# Patient Record
Sex: FEMALE | Race: WHITE | NOT HISPANIC OR LATINO | URBAN - METROPOLITAN AREA
[De-identification: names, ages, dates, MRNs, and addresses within clinical notes are randomized per-mention and may not be internally consistent; named-entity substitution may affect disease eponyms.]

---

## 2017-05-17 ENCOUNTER — INPATIENT (INPATIENT)
Facility: HOSPITAL | Age: 82
LOS: 8 days | Discharge: HOME | DRG: 190 | End: 2017-05-26
Attending: INTERNAL MEDICINE | Admitting: INTERNAL MEDICINE
Payer: MEDICARE

## 2017-05-17 VITALS
OXYGEN SATURATION: 98 % | RESPIRATION RATE: 18 BRPM | DIASTOLIC BLOOD PRESSURE: 81 MMHG | TEMPERATURE: 98 F | HEART RATE: 85 BPM | SYSTOLIC BLOOD PRESSURE: 138 MMHG

## 2017-05-17 DIAGNOSIS — J18.9 PNEUMONIA, UNSPECIFIED ORGANISM: ICD-10-CM

## 2017-05-17 LAB
ALBUMIN SERPL ELPH-MCNC: 3.3 G/DL — SIGNIFICANT CHANGE UP (ref 3.3–5)
ALP SERPL-CCNC: 127 U/L — HIGH (ref 40–120)
ALT FLD-CCNC: 27 U/L RC — SIGNIFICANT CHANGE UP (ref 10–45)
ANION GAP SERPL CALC-SCNC: 14 MMOL/L — SIGNIFICANT CHANGE UP (ref 5–17)
APTT BLD: 27.3 SEC — LOW (ref 27.5–37.4)
AST SERPL-CCNC: 29 U/L — SIGNIFICANT CHANGE UP (ref 10–40)
BILIRUB SERPL-MCNC: 0.4 MG/DL — SIGNIFICANT CHANGE UP (ref 0.2–1.2)
BUN SERPL-MCNC: 20 MG/DL — SIGNIFICANT CHANGE UP (ref 7–23)
CALCIUM SERPL-MCNC: 8.9 MG/DL — SIGNIFICANT CHANGE UP (ref 8.4–10.5)
CHLORIDE SERPL-SCNC: 97 MMOL/L — SIGNIFICANT CHANGE UP (ref 96–108)
CO2 SERPL-SCNC: 22 MMOL/L — SIGNIFICANT CHANGE UP (ref 22–31)
CREAT SERPL-MCNC: 0.82 MG/DL — SIGNIFICANT CHANGE UP (ref 0.5–1.3)
EOSINOPHIL NFR BLD AUTO: 1 % — SIGNIFICANT CHANGE UP (ref 0–6)
GAS PNL BLDV: SIGNIFICANT CHANGE UP
GLUCOSE SERPL-MCNC: 111 MG/DL — HIGH (ref 70–99)
HCT VFR BLD CALC: 37.4 % — SIGNIFICANT CHANGE UP (ref 34.5–45)
HGB BLD-MCNC: 12.5 G/DL — SIGNIFICANT CHANGE UP (ref 11.5–15.5)
INR BLD: 1.09 RATIO — SIGNIFICANT CHANGE UP (ref 0.88–1.16)
LYMPHOCYTES # BLD AUTO: 11 % — LOW (ref 13–44)
MCHC RBC-ENTMCNC: 30.8 PG — SIGNIFICANT CHANGE UP (ref 27–34)
MCHC RBC-ENTMCNC: 33.3 GM/DL — SIGNIFICANT CHANGE UP (ref 32–36)
MCV RBC AUTO: 92.3 FL — SIGNIFICANT CHANGE UP (ref 80–100)
MONOCYTES NFR BLD AUTO: 5 % — SIGNIFICANT CHANGE UP (ref 2–14)
NEUTROPHILS NFR BLD AUTO: 83 % — HIGH (ref 43–77)
NT-PROBNP SERPL-SCNC: 3031 PG/ML — HIGH (ref 0–300)
PLATELET # BLD AUTO: 279 K/UL — SIGNIFICANT CHANGE UP (ref 150–400)
POTASSIUM SERPL-MCNC: 4.4 MMOL/L — SIGNIFICANT CHANGE UP (ref 3.5–5.3)
POTASSIUM SERPL-SCNC: 4.4 MMOL/L — SIGNIFICANT CHANGE UP (ref 3.5–5.3)
PROT SERPL-MCNC: 6.4 G/DL — SIGNIFICANT CHANGE UP (ref 6–8.3)
PROTHROM AB SERPL-ACNC: 11.8 SEC — SIGNIFICANT CHANGE UP (ref 9.8–12.7)
RBC # BLD: 4.05 M/UL — SIGNIFICANT CHANGE UP (ref 3.8–5.2)
RBC # FLD: 12.7 % — SIGNIFICANT CHANGE UP (ref 10.3–14.5)
SODIUM SERPL-SCNC: 133 MMOL/L — LOW (ref 135–145)
WBC # BLD: 19.2 K/UL — HIGH (ref 3.8–10.5)
WBC # FLD AUTO: 19.2 K/UL — HIGH (ref 3.8–10.5)

## 2017-05-17 PROCEDURE — 71010: CPT | Mod: 26

## 2017-05-17 PROCEDURE — 99285 EMERGENCY DEPT VISIT HI MDM: CPT | Mod: 25,GC

## 2017-05-17 PROCEDURE — 93010 ELECTROCARDIOGRAM REPORT: CPT

## 2017-05-17 RX ORDER — CEFEPIME 1 G/1
2000 INJECTION, POWDER, FOR SOLUTION INTRAMUSCULAR; INTRAVENOUS ONCE
Qty: 0 | Refills: 0 | Status: COMPLETED | OUTPATIENT
Start: 2017-05-17 | End: 2017-05-17

## 2017-05-17 RX ORDER — VANCOMYCIN HCL 1 G
1000 VIAL (EA) INTRAVENOUS ONCE
Qty: 0 | Refills: 0 | Status: COMPLETED | OUTPATIENT
Start: 2017-05-17 | End: 2017-05-18

## 2017-05-17 RX ORDER — AZITHROMYCIN 500 MG/1
500 TABLET, FILM COATED ORAL ONCE
Qty: 0 | Refills: 0 | Status: COMPLETED | OUTPATIENT
Start: 2017-05-17 | End: 2017-05-17

## 2017-05-17 RX ORDER — IPRATROPIUM/ALBUTEROL SULFATE 18-103MCG
3 AEROSOL WITH ADAPTER (GRAM) INHALATION ONCE
Qty: 0 | Refills: 0 | Status: COMPLETED | OUTPATIENT
Start: 2017-05-17 | End: 2017-05-17

## 2017-05-17 RX ADMIN — Medication 3 MILLILITER(S): at 22:13

## 2017-05-17 RX ADMIN — CEFEPIME 100 MILLIGRAM(S): 1 INJECTION, POWDER, FOR SOLUTION INTRAMUSCULAR; INTRAVENOUS at 23:13

## 2017-05-17 RX ADMIN — AZITHROMYCIN 250 MILLIGRAM(S): 500 TABLET, FILM COATED ORAL at 23:47

## 2017-05-17 NOTE — ED ADULT NURSE NOTE - PSH
S/P Colonoscopy    S/P Endoscopy    S/P exploratory laparotomy  Lysis of Adhesion  S/P Small Bowel Resection    SBO (small bowel obstruction)  initial SBO approx 10 yrs ago s/p partial resection small intestine, then lysis of adhesions approx 2012

## 2017-05-17 NOTE — ED PROVIDER NOTE - ATTENDING CONTRIBUTION TO CARE
Attending MD Dick:  I personally have seen and examined this patient.  Resident note reviewed and agree on plan of care and except where noted.  See HPI, PE, and MDM for details.

## 2017-05-17 NOTE — ED ADULT NURSE NOTE - PMH
Benign Essential Hypertension    Biventricular heart failure    CAD (coronary artery disease)    CAD (coronary artery disease)    Carotid stenosis    chf    Chronic Glaucoma    GERD (gastroesophageal reflux disease)    Hiatal Hernia    HLD (hyperlipidemia)    HTN (hypertension)    Hyperlipidemia, Acquired    Mitral valve disorder    Osteopenia    RBBB    Small bowel obstruction  H/O

## 2017-05-17 NOTE — ED ADULT NURSE NOTE - OBJECTIVE STATEMENT
89 y/o female presenting to the ED with generalized weakness and cough x 1 week; Per patient has been bringing up yellow tinged flem; Patient states "was walking around in the heat all day"; Patient denies chest pain, SOB, difficulty breathing, n/v/d, dizziness, fevers, chills; Patient SPO2 92% on room air placed on 4 L NC O2 became 97%; bilateral strength and sensation in extremities; a&ox3; safety and comfort measures provided

## 2017-05-17 NOTE — ED PROVIDER NOTE - OBJECTIVE STATEMENT
89yo p/w shortness of breath for 1 day. Pt. reports yellow mucus production with cough. Denies fevers, abdominal pain, chest pain, nausea/vomiting/diarrhea. Pt. using nebulizer at home. Denies any missed medications(lasix). PCP: Ismael August Cards: Broderick León 88yo p/w shortness of breath for 1 day. Pt. reports yellow mucus production with cough. Son reports occasional fevers at home. Denies abdominal pain, chest pain, nausea/vomiting/diarrhea. Pt. using nebulizer at home. Denies any missed medications(lasix). PCP: Ismael August Cards: Broderick León

## 2017-05-17 NOTE — ED PROVIDER NOTE - MEDICAL DECISION MAKING DETAILS
Attending MD Dick: 89F with CHF, asthma presenting with cough and dyspnea, subjective fever as well at home, ddx includes PNA, CHF or asthma exacerbation, not much wheezing to suggest RAD so suspect PNA, will treat for HCAP, admit

## 2017-05-18 DIAGNOSIS — J18.9 PNEUMONIA, UNSPECIFIED ORGANISM: ICD-10-CM

## 2017-05-18 DIAGNOSIS — K21.9 GASTRO-ESOPHAGEAL REFLUX DISEASE WITHOUT ESOPHAGITIS: ICD-10-CM

## 2017-05-18 DIAGNOSIS — I50.40 UNSPECIFIED COMBINED SYSTOLIC (CONGESTIVE) AND DIASTOLIC (CONGESTIVE) HEART FAILURE: ICD-10-CM

## 2017-05-18 LAB
ANION GAP SERPL CALC-SCNC: 15 MMOL/L — SIGNIFICANT CHANGE UP (ref 5–17)
BUN SERPL-MCNC: 15 MG/DL — SIGNIFICANT CHANGE UP (ref 7–23)
CALCIUM SERPL-MCNC: 8.4 MG/DL — SIGNIFICANT CHANGE UP (ref 8.4–10.5)
CHLORIDE SERPL-SCNC: 101 MMOL/L — SIGNIFICANT CHANGE UP (ref 96–108)
CO2 SERPL-SCNC: 20 MMOL/L — LOW (ref 22–31)
CREAT SERPL-MCNC: 0.76 MG/DL — SIGNIFICANT CHANGE UP (ref 0.5–1.3)
GLUCOSE SERPL-MCNC: 74 MG/DL — SIGNIFICANT CHANGE UP (ref 70–99)
HCT VFR BLD CALC: 36.6 % — SIGNIFICANT CHANGE UP (ref 34.5–45)
HGB BLD-MCNC: 11.9 G/DL — SIGNIFICANT CHANGE UP (ref 11.5–15.5)
MCHC RBC-ENTMCNC: 28.7 PG — SIGNIFICANT CHANGE UP (ref 27–34)
MCHC RBC-ENTMCNC: 32.5 GM/DL — SIGNIFICANT CHANGE UP (ref 32–36)
MCV RBC AUTO: 88.4 FL — SIGNIFICANT CHANGE UP (ref 80–100)
PLATELET # BLD AUTO: 307 K/UL — SIGNIFICANT CHANGE UP (ref 150–400)
POTASSIUM SERPL-MCNC: 4.5 MMOL/L — SIGNIFICANT CHANGE UP (ref 3.5–5.3)
POTASSIUM SERPL-SCNC: 4.5 MMOL/L — SIGNIFICANT CHANGE UP (ref 3.5–5.3)
RAPID RVP RESULT: SIGNIFICANT CHANGE UP
RBC # BLD: 4.14 M/UL — SIGNIFICANT CHANGE UP (ref 3.8–5.2)
RBC # FLD: 14.5 % — SIGNIFICANT CHANGE UP (ref 10.3–14.5)
SODIUM SERPL-SCNC: 136 MMOL/L — SIGNIFICANT CHANGE UP (ref 135–145)
WBC # BLD: 13.07 K/UL — HIGH (ref 3.8–10.5)
WBC # FLD AUTO: 13.07 K/UL — HIGH (ref 3.8–10.5)

## 2017-05-18 PROCEDURE — 99222 1ST HOSP IP/OBS MODERATE 55: CPT

## 2017-05-18 PROCEDURE — 71250 CT THORAX DX C-: CPT | Mod: 26

## 2017-05-18 RX ORDER — SIMVASTATIN 20 MG/1
20 TABLET, FILM COATED ORAL AT BEDTIME
Qty: 0 | Refills: 0 | Status: DISCONTINUED | OUTPATIENT
Start: 2017-05-18 | End: 2017-05-26

## 2017-05-18 RX ORDER — RALOXIFENE HYDROCHLORIDE 60 MG/1
60 TABLET, COATED ORAL DAILY
Qty: 0 | Refills: 0 | Status: DISCONTINUED | OUTPATIENT
Start: 2017-05-18 | End: 2017-05-26

## 2017-05-18 RX ORDER — VANCOMYCIN HCL 1 G
1000 VIAL (EA) INTRAVENOUS ONCE
Qty: 0 | Refills: 0 | Status: COMPLETED | OUTPATIENT
Start: 2017-05-18 | End: 2017-05-18

## 2017-05-18 RX ORDER — LISINOPRIL 2.5 MG/1
10 TABLET ORAL DAILY
Qty: 0 | Refills: 0 | Status: DISCONTINUED | OUTPATIENT
Start: 2017-05-18 | End: 2017-05-26

## 2017-05-18 RX ORDER — CEFEPIME 1 G/1
2000 INJECTION, POWDER, FOR SOLUTION INTRAMUSCULAR; INTRAVENOUS ONCE
Qty: 0 | Refills: 0 | Status: COMPLETED | OUTPATIENT
Start: 2017-05-18 | End: 2017-05-18

## 2017-05-18 RX ORDER — FUROSEMIDE 40 MG
40 TABLET ORAL EVERY 24 HOURS
Qty: 0 | Refills: 0 | Status: COMPLETED | OUTPATIENT
Start: 2017-05-18 | End: 2017-05-18

## 2017-05-18 RX ORDER — AMLODIPINE BESYLATE 2.5 MG/1
2.5 TABLET ORAL DAILY
Qty: 0 | Refills: 0 | Status: DISCONTINUED | OUTPATIENT
Start: 2017-05-18 | End: 2017-05-26

## 2017-05-18 RX ORDER — ASPIRIN/CALCIUM CARB/MAGNESIUM 324 MG
81 TABLET ORAL DAILY
Qty: 0 | Refills: 0 | Status: DISCONTINUED | OUTPATIENT
Start: 2017-05-18 | End: 2017-05-26

## 2017-05-18 RX ORDER — METOPROLOL TARTRATE 50 MG
25 TABLET ORAL DAILY
Qty: 0 | Refills: 0 | Status: DISCONTINUED | OUTPATIENT
Start: 2017-05-18 | End: 2017-05-26

## 2017-05-18 RX ORDER — FUROSEMIDE 40 MG
20 TABLET ORAL DAILY
Qty: 0 | Refills: 0 | Status: DISCONTINUED | OUTPATIENT
Start: 2017-05-18 | End: 2017-05-26

## 2017-05-18 RX ORDER — CEFEPIME 1 G/1
INJECTION, POWDER, FOR SOLUTION INTRAMUSCULAR; INTRAVENOUS
Qty: 0 | Refills: 0 | Status: DISCONTINUED | OUTPATIENT
Start: 2017-05-18 | End: 2017-05-18

## 2017-05-18 RX ORDER — HEPARIN SODIUM 5000 [USP'U]/ML
5000 INJECTION INTRAVENOUS; SUBCUTANEOUS EVERY 12 HOURS
Qty: 0 | Refills: 0 | Status: DISCONTINUED | OUTPATIENT
Start: 2017-05-18 | End: 2017-05-26

## 2017-05-18 RX ORDER — PIPERACILLIN AND TAZOBACTAM 4; .5 G/20ML; G/20ML
3.38 INJECTION, POWDER, LYOPHILIZED, FOR SOLUTION INTRAVENOUS EVERY 8 HOURS
Qty: 0 | Refills: 0 | Status: DISCONTINUED | OUTPATIENT
Start: 2017-05-18 | End: 2017-05-18

## 2017-05-18 RX ORDER — VANCOMYCIN HCL 1 G
1000 VIAL (EA) INTRAVENOUS EVERY 24 HOURS
Qty: 0 | Refills: 0 | Status: DISCONTINUED | OUTPATIENT
Start: 2017-05-19 | End: 2017-05-21

## 2017-05-18 RX ORDER — PIPERACILLIN AND TAZOBACTAM 4; .5 G/20ML; G/20ML
3.38 INJECTION, POWDER, LYOPHILIZED, FOR SOLUTION INTRAVENOUS ONCE
Qty: 0 | Refills: 0 | Status: DISCONTINUED | OUTPATIENT
Start: 2017-05-18 | End: 2017-05-18

## 2017-05-18 RX ORDER — VANCOMYCIN HCL 1 G
VIAL (EA) INTRAVENOUS
Qty: 0 | Refills: 0 | Status: DISCONTINUED | OUTPATIENT
Start: 2017-05-18 | End: 2017-05-21

## 2017-05-18 RX ORDER — SODIUM CHLORIDE 9 MG/ML
1000 INJECTION INTRAMUSCULAR; INTRAVENOUS; SUBCUTANEOUS ONCE
Qty: 0 | Refills: 0 | Status: COMPLETED | OUTPATIENT
Start: 2017-05-18 | End: 2017-05-18

## 2017-05-18 RX ORDER — CEFEPIME 1 G/1
2000 INJECTION, POWDER, FOR SOLUTION INTRAMUSCULAR; INTRAVENOUS EVERY 12 HOURS
Qty: 0 | Refills: 0 | Status: DISCONTINUED | OUTPATIENT
Start: 2017-05-18 | End: 2017-05-18

## 2017-05-18 RX ORDER — PIPERACILLIN AND TAZOBACTAM 4; .5 G/20ML; G/20ML
3.38 INJECTION, POWDER, LYOPHILIZED, FOR SOLUTION INTRAVENOUS ONCE
Qty: 0 | Refills: 0 | Status: COMPLETED | OUTPATIENT
Start: 2017-05-18 | End: 2017-05-18

## 2017-05-18 RX ORDER — PIPERACILLIN AND TAZOBACTAM 4; .5 G/20ML; G/20ML
3.38 INJECTION, POWDER, LYOPHILIZED, FOR SOLUTION INTRAVENOUS EVERY 8 HOURS
Qty: 0 | Refills: 0 | Status: DISCONTINUED | OUTPATIENT
Start: 2017-05-18 | End: 2017-05-25

## 2017-05-18 RX ADMIN — Medication 81 MILLIGRAM(S): at 11:49

## 2017-05-18 RX ADMIN — AMLODIPINE BESYLATE 2.5 MILLIGRAM(S): 2.5 TABLET ORAL at 05:24

## 2017-05-18 RX ADMIN — Medication 100 MILLIGRAM(S): at 07:03

## 2017-05-18 RX ADMIN — SODIUM CHLORIDE 1000 MILLILITER(S): 9 INJECTION INTRAMUSCULAR; INTRAVENOUS; SUBCUTANEOUS at 03:06

## 2017-05-18 RX ADMIN — PIPERACILLIN AND TAZOBACTAM 200 GRAM(S): 4; .5 INJECTION, POWDER, LYOPHILIZED, FOR SOLUTION INTRAVENOUS at 18:18

## 2017-05-18 RX ADMIN — SIMVASTATIN 20 MILLIGRAM(S): 20 TABLET, FILM COATED ORAL at 21:00

## 2017-05-18 RX ADMIN — HEPARIN SODIUM 5000 UNIT(S): 5000 INJECTION INTRAVENOUS; SUBCUTANEOUS at 18:18

## 2017-05-18 RX ADMIN — LISINOPRIL 10 MILLIGRAM(S): 2.5 TABLET ORAL at 05:24

## 2017-05-18 RX ADMIN — Medication 250 MILLIGRAM(S): at 11:49

## 2017-05-18 RX ADMIN — CEFEPIME 100 MILLIGRAM(S): 1 INJECTION, POWDER, FOR SOLUTION INTRAMUSCULAR; INTRAVENOUS at 07:40

## 2017-05-18 RX ADMIN — Medication 100 MILLIGRAM(S): at 15:58

## 2017-05-18 RX ADMIN — Medication 250 MILLIGRAM(S): at 02:23

## 2017-05-18 RX ADMIN — Medication 100 MILLIGRAM(S): at 21:31

## 2017-05-18 RX ADMIN — Medication 25 MILLIGRAM(S): at 05:24

## 2017-05-18 RX ADMIN — RALOXIFENE HYDROCHLORIDE 60 MILLIGRAM(S): 60 TABLET, COATED ORAL at 15:58

## 2017-05-18 RX ADMIN — Medication 40 MILLIGRAM(S): at 05:24

## 2017-05-18 NOTE — H&P ADULT. - HISTORY OF PRESENT ILLNESS
· HPI Objective Statement: 89yo p/w shortness of breath for 1 day. Pt. reports yellow mucus production with cough. Son reports occasional fevers at home. Denies abdominal pain, chest pain, nausea/vomiting/diarrhea. Pt. using nebulizer at home. Denies any missed medications(lasix). PCP: Ismael August Cards: Broderick León

## 2017-05-18 NOTE — PATIENT PROFILE ADULT. - TEACHING/LEARNING LEARNING PREFERENCES
individual instruction individual instruction/written material/verbal instruction/skill demonstration

## 2017-05-18 NOTE — ED ADULT NURSE REASSESSMENT NOTE - NS ED NURSE REASSESS COMMENT FT1
0200 Patient appears to be resting comfortably in stretcher; denies pain at this time; no respiratory distress noted; a&ox3; safety and comfort measures provided

## 2017-05-18 NOTE — H&P ADULT. - PROBLEM SELECTOR PLAN 3
with start cefepime  will asp pulmonary and ID to see physical therapy  discussed with son at bedside

## 2017-05-19 LAB
ANION GAP SERPL CALC-SCNC: 18 MMOL/L — HIGH (ref 5–17)
BUN SERPL-MCNC: 14 MG/DL — SIGNIFICANT CHANGE UP (ref 7–23)
CALCIUM SERPL-MCNC: 9 MG/DL — SIGNIFICANT CHANGE UP (ref 8.4–10.5)
CHLORIDE SERPL-SCNC: 101 MMOL/L — SIGNIFICANT CHANGE UP (ref 96–108)
CO2 SERPL-SCNC: 16 MMOL/L — LOW (ref 22–31)
CREAT SERPL-MCNC: 0.81 MG/DL — SIGNIFICANT CHANGE UP (ref 0.5–1.3)
GLUCOSE SERPL-MCNC: 122 MG/DL — HIGH (ref 70–99)
GRAM STN FLD: SIGNIFICANT CHANGE UP
POTASSIUM SERPL-MCNC: 5 MMOL/L — SIGNIFICANT CHANGE UP (ref 3.5–5.3)
POTASSIUM SERPL-SCNC: 5 MMOL/L — SIGNIFICANT CHANGE UP (ref 3.5–5.3)
SODIUM SERPL-SCNC: 135 MMOL/L — SIGNIFICANT CHANGE UP (ref 135–145)
SPECIMEN SOURCE: SIGNIFICANT CHANGE UP

## 2017-05-19 PROCEDURE — 93306 TTE W/DOPPLER COMPLETE: CPT | Mod: 26

## 2017-05-19 PROCEDURE — 99232 SBSQ HOSP IP/OBS MODERATE 35: CPT

## 2017-05-19 RX ORDER — IPRATROPIUM/ALBUTEROL SULFATE 18-103MCG
3 AEROSOL WITH ADAPTER (GRAM) INHALATION EVERY 6 HOURS
Qty: 0 | Refills: 0 | Status: DISCONTINUED | OUTPATIENT
Start: 2017-05-19 | End: 2017-05-19

## 2017-05-19 RX ORDER — IPRATROPIUM/ALBUTEROL SULFATE 18-103MCG
3 AEROSOL WITH ADAPTER (GRAM) INHALATION EVERY 8 HOURS
Qty: 0 | Refills: 0 | Status: DISCONTINUED | OUTPATIENT
Start: 2017-05-19 | End: 2017-05-26

## 2017-05-19 RX ORDER — TOBRAMYCIN 0.3 %
1 DROPS OPHTHALMIC (EYE)
Qty: 0 | Refills: 0 | Status: DISCONTINUED | OUTPATIENT
Start: 2017-05-19 | End: 2017-05-26

## 2017-05-19 RX ORDER — NEOMYCIN/POLYMYXIN B/DEXAMETHA 0.1 %
1 SUSPENSION, DROPS(FINAL DOSAGE FORM)(ML) OPHTHALMIC (EYE)
Qty: 0 | Refills: 0 | Status: DISCONTINUED | OUTPATIENT
Start: 2017-05-19 | End: 2017-05-26

## 2017-05-19 RX ADMIN — Medication 25 MILLIGRAM(S): at 05:08

## 2017-05-19 RX ADMIN — Medication 100 MILLIGRAM(S): at 13:19

## 2017-05-19 RX ADMIN — Medication 1 DROP(S): at 17:41

## 2017-05-19 RX ADMIN — Medication 81 MILLIGRAM(S): at 13:02

## 2017-05-19 RX ADMIN — PIPERACILLIN AND TAZOBACTAM 25 GRAM(S): 4; .5 INJECTION, POWDER, LYOPHILIZED, FOR SOLUTION INTRAVENOUS at 21:25

## 2017-05-19 RX ADMIN — AMLODIPINE BESYLATE 2.5 MILLIGRAM(S): 2.5 TABLET ORAL at 05:08

## 2017-05-19 RX ADMIN — HEPARIN SODIUM 5000 UNIT(S): 5000 INJECTION INTRAVENOUS; SUBCUTANEOUS at 05:07

## 2017-05-19 RX ADMIN — LISINOPRIL 10 MILLIGRAM(S): 2.5 TABLET ORAL at 05:08

## 2017-05-19 RX ADMIN — RALOXIFENE HYDROCHLORIDE 60 MILLIGRAM(S): 60 TABLET, COATED ORAL at 17:41

## 2017-05-19 RX ADMIN — Medication 20 MILLIGRAM(S): at 05:08

## 2017-05-19 RX ADMIN — Medication 1 APPLICATION(S): at 17:42

## 2017-05-19 RX ADMIN — Medication 20 MILLIGRAM(S): at 21:26

## 2017-05-19 RX ADMIN — HEPARIN SODIUM 5000 UNIT(S): 5000 INJECTION INTRAVENOUS; SUBCUTANEOUS at 17:41

## 2017-05-19 RX ADMIN — PIPERACILLIN AND TAZOBACTAM 25 GRAM(S): 4; .5 INJECTION, POWDER, LYOPHILIZED, FOR SOLUTION INTRAVENOUS at 13:02

## 2017-05-19 RX ADMIN — PIPERACILLIN AND TAZOBACTAM 25 GRAM(S): 4; .5 INJECTION, POWDER, LYOPHILIZED, FOR SOLUTION INTRAVENOUS at 04:25

## 2017-05-19 RX ADMIN — Medication 3 MILLILITER(S): at 21:27

## 2017-05-19 RX ADMIN — SIMVASTATIN 20 MILLIGRAM(S): 20 TABLET, FILM COATED ORAL at 21:27

## 2017-05-19 RX ADMIN — Medication 250 MILLIGRAM(S): at 10:48

## 2017-05-20 LAB
ANION GAP SERPL CALC-SCNC: 19 MMOL/L — HIGH (ref 5–17)
BUN SERPL-MCNC: 12 MG/DL — SIGNIFICANT CHANGE UP (ref 7–23)
CALCIUM SERPL-MCNC: 9.5 MG/DL — SIGNIFICANT CHANGE UP (ref 8.4–10.5)
CHLORIDE SERPL-SCNC: 98 MMOL/L — SIGNIFICANT CHANGE UP (ref 96–108)
CO2 SERPL-SCNC: 21 MMOL/L — LOW (ref 22–31)
CREAT SERPL-MCNC: 0.85 MG/DL — SIGNIFICANT CHANGE UP (ref 0.5–1.3)
CULTURE RESULTS: SIGNIFICANT CHANGE UP
GLUCOSE SERPL-MCNC: 178 MG/DL — HIGH (ref 70–99)
HCT VFR BLD CALC: 42.9 % — SIGNIFICANT CHANGE UP (ref 34.5–45)
HGB BLD-MCNC: 14.3 G/DL — SIGNIFICANT CHANGE UP (ref 11.5–15.5)
MCHC RBC-ENTMCNC: 30 PG — SIGNIFICANT CHANGE UP (ref 27–34)
MCHC RBC-ENTMCNC: 33.3 GM/DL — SIGNIFICANT CHANGE UP (ref 32–36)
MCV RBC AUTO: 90.1 FL — SIGNIFICANT CHANGE UP (ref 80–100)
PLATELET # BLD AUTO: 340 K/UL — SIGNIFICANT CHANGE UP (ref 150–400)
POTASSIUM SERPL-MCNC: 4.4 MMOL/L — SIGNIFICANT CHANGE UP (ref 3.5–5.3)
POTASSIUM SERPL-SCNC: 4.4 MMOL/L — SIGNIFICANT CHANGE UP (ref 3.5–5.3)
RBC # BLD: 4.76 M/UL — SIGNIFICANT CHANGE UP (ref 3.8–5.2)
RBC # FLD: 14.3 % — SIGNIFICANT CHANGE UP (ref 10.3–14.5)
SODIUM SERPL-SCNC: 138 MMOL/L — SIGNIFICANT CHANGE UP (ref 135–145)
SPECIMEN SOURCE: SIGNIFICANT CHANGE UP
VANCOMYCIN TROUGH SERPL-MCNC: 12.8 UG/ML — SIGNIFICANT CHANGE UP (ref 10–20)
WBC # BLD: 7.04 K/UL — SIGNIFICANT CHANGE UP (ref 3.8–10.5)
WBC # FLD AUTO: 7.04 K/UL — SIGNIFICANT CHANGE UP (ref 3.8–10.5)

## 2017-05-20 RX ADMIN — Medication 25 MILLIGRAM(S): at 05:12

## 2017-05-20 RX ADMIN — Medication 250 MILLIGRAM(S): at 11:11

## 2017-05-20 RX ADMIN — Medication 1 DROP(S): at 11:12

## 2017-05-20 RX ADMIN — Medication 3 MILLILITER(S): at 05:11

## 2017-05-20 RX ADMIN — RALOXIFENE HYDROCHLORIDE 60 MILLIGRAM(S): 60 TABLET, COATED ORAL at 11:12

## 2017-05-20 RX ADMIN — AMLODIPINE BESYLATE 2.5 MILLIGRAM(S): 2.5 TABLET ORAL at 05:12

## 2017-05-20 RX ADMIN — PIPERACILLIN AND TAZOBACTAM 25 GRAM(S): 4; .5 INJECTION, POWDER, LYOPHILIZED, FOR SOLUTION INTRAVENOUS at 22:14

## 2017-05-20 RX ADMIN — HEPARIN SODIUM 5000 UNIT(S): 5000 INJECTION INTRAVENOUS; SUBCUTANEOUS at 17:34

## 2017-05-20 RX ADMIN — Medication 3 MILLILITER(S): at 13:16

## 2017-05-20 RX ADMIN — Medication 1 APPLICATION(S): at 18:48

## 2017-05-20 RX ADMIN — Medication 20 MILLIGRAM(S): at 05:12

## 2017-05-20 RX ADMIN — PIPERACILLIN AND TAZOBACTAM 25 GRAM(S): 4; .5 INJECTION, POWDER, LYOPHILIZED, FOR SOLUTION INTRAVENOUS at 05:10

## 2017-05-20 RX ADMIN — Medication 1 DROP(S): at 18:47

## 2017-05-20 RX ADMIN — HEPARIN SODIUM 5000 UNIT(S): 5000 INJECTION INTRAVENOUS; SUBCUTANEOUS at 05:13

## 2017-05-20 RX ADMIN — LISINOPRIL 10 MILLIGRAM(S): 2.5 TABLET ORAL at 05:12

## 2017-05-20 RX ADMIN — PIPERACILLIN AND TAZOBACTAM 25 GRAM(S): 4; .5 INJECTION, POWDER, LYOPHILIZED, FOR SOLUTION INTRAVENOUS at 13:16

## 2017-05-20 RX ADMIN — SIMVASTATIN 20 MILLIGRAM(S): 20 TABLET, FILM COATED ORAL at 22:17

## 2017-05-20 RX ADMIN — Medication 1 DROP(S): at 05:13

## 2017-05-20 RX ADMIN — Medication 81 MILLIGRAM(S): at 11:12

## 2017-05-20 RX ADMIN — Medication 3 MILLILITER(S): at 22:17

## 2017-05-20 RX ADMIN — Medication 20 MILLIGRAM(S): at 05:11

## 2017-05-20 RX ADMIN — Medication 20 MILLIGRAM(S): at 13:16

## 2017-05-20 RX ADMIN — Medication 20 MILLIGRAM(S): at 22:18

## 2017-05-21 PROCEDURE — 99232 SBSQ HOSP IP/OBS MODERATE 35: CPT

## 2017-05-21 RX ORDER — NYSTATIN CREAM 100000 [USP'U]/G
1 CREAM TOPICAL
Qty: 0 | Refills: 0 | Status: DISCONTINUED | OUTPATIENT
Start: 2017-05-21 | End: 2017-05-26

## 2017-05-21 RX ADMIN — Medication 250 MILLIGRAM(S): at 10:19

## 2017-05-21 RX ADMIN — PIPERACILLIN AND TAZOBACTAM 25 GRAM(S): 4; .5 INJECTION, POWDER, LYOPHILIZED, FOR SOLUTION INTRAVENOUS at 22:01

## 2017-05-21 RX ADMIN — Medication 100 MILLIGRAM(S): at 22:03

## 2017-05-21 RX ADMIN — Medication 3 MILLILITER(S): at 22:03

## 2017-05-21 RX ADMIN — Medication 40 MILLIGRAM(S): at 17:32

## 2017-05-21 RX ADMIN — Medication 1 APPLICATION(S): at 05:37

## 2017-05-21 RX ADMIN — Medication 20 MILLIGRAM(S): at 05:43

## 2017-05-21 RX ADMIN — Medication 1 DROP(S): at 00:03

## 2017-05-21 RX ADMIN — Medication 20 MILLIGRAM(S): at 05:37

## 2017-05-21 RX ADMIN — Medication 3 MILLILITER(S): at 17:31

## 2017-05-21 RX ADMIN — Medication 1 DROP(S): at 17:32

## 2017-05-21 RX ADMIN — SIMVASTATIN 20 MILLIGRAM(S): 20 TABLET, FILM COATED ORAL at 22:01

## 2017-05-21 RX ADMIN — HEPARIN SODIUM 5000 UNIT(S): 5000 INJECTION INTRAVENOUS; SUBCUTANEOUS at 05:46

## 2017-05-21 RX ADMIN — Medication 1 APPLICATION(S): at 17:31

## 2017-05-21 RX ADMIN — Medication 1 DROP(S): at 05:35

## 2017-05-21 RX ADMIN — LISINOPRIL 10 MILLIGRAM(S): 2.5 TABLET ORAL at 05:36

## 2017-05-21 RX ADMIN — AMLODIPINE BESYLATE 2.5 MILLIGRAM(S): 2.5 TABLET ORAL at 05:37

## 2017-05-21 RX ADMIN — RALOXIFENE HYDROCHLORIDE 60 MILLIGRAM(S): 60 TABLET, COATED ORAL at 12:53

## 2017-05-21 RX ADMIN — Medication 1 DROP(S): at 12:54

## 2017-05-21 RX ADMIN — HEPARIN SODIUM 5000 UNIT(S): 5000 INJECTION INTRAVENOUS; SUBCUTANEOUS at 17:32

## 2017-05-21 RX ADMIN — PIPERACILLIN AND TAZOBACTAM 25 GRAM(S): 4; .5 INJECTION, POWDER, LYOPHILIZED, FOR SOLUTION INTRAVENOUS at 12:54

## 2017-05-21 RX ADMIN — Medication 81 MILLIGRAM(S): at 12:54

## 2017-05-21 RX ADMIN — PIPERACILLIN AND TAZOBACTAM 25 GRAM(S): 4; .5 INJECTION, POWDER, LYOPHILIZED, FOR SOLUTION INTRAVENOUS at 05:36

## 2017-05-21 RX ADMIN — Medication 3 MILLILITER(S): at 05:35

## 2017-05-21 RX ADMIN — Medication 25 MILLIGRAM(S): at 05:37

## 2017-05-22 ENCOUNTER — TRANSCRIPTION ENCOUNTER (OUTPATIENT)
Age: 82
End: 2017-05-22

## 2017-05-22 LAB
ANION GAP SERPL CALC-SCNC: 18 MMOL/L — HIGH (ref 5–17)
BUN SERPL-MCNC: 18 MG/DL — SIGNIFICANT CHANGE UP (ref 7–23)
CALCIUM SERPL-MCNC: 9.4 MG/DL — SIGNIFICANT CHANGE UP (ref 8.4–10.5)
CHLORIDE SERPL-SCNC: 95 MMOL/L — LOW (ref 96–108)
CO2 SERPL-SCNC: 20 MMOL/L — LOW (ref 22–31)
CREAT SERPL-MCNC: 0.94 MG/DL — SIGNIFICANT CHANGE UP (ref 0.5–1.3)
GLUCOSE SERPL-MCNC: 114 MG/DL — HIGH (ref 70–99)
HCT VFR BLD CALC: 36.8 % — SIGNIFICANT CHANGE UP (ref 34.5–45)
HGB BLD-MCNC: 11.9 G/DL — SIGNIFICANT CHANGE UP (ref 11.5–15.5)
MCHC RBC-ENTMCNC: 28.6 PG — SIGNIFICANT CHANGE UP (ref 27–34)
MCHC RBC-ENTMCNC: 32.3 GM/DL — SIGNIFICANT CHANGE UP (ref 32–36)
MCV RBC AUTO: 88.5 FL — SIGNIFICANT CHANGE UP (ref 80–100)
PLATELET # BLD AUTO: 367 K/UL — SIGNIFICANT CHANGE UP (ref 150–400)
POTASSIUM SERPL-MCNC: 4.7 MMOL/L — SIGNIFICANT CHANGE UP (ref 3.5–5.3)
POTASSIUM SERPL-SCNC: 4.7 MMOL/L — SIGNIFICANT CHANGE UP (ref 3.5–5.3)
RBC # BLD: 4.16 M/UL — SIGNIFICANT CHANGE UP (ref 3.8–5.2)
RBC # FLD: 14.4 % — SIGNIFICANT CHANGE UP (ref 10.3–14.5)
SODIUM SERPL-SCNC: 133 MMOL/L — LOW (ref 135–145)
WBC # BLD: 18.53 K/UL — HIGH (ref 3.8–10.5)
WBC # FLD AUTO: 18.53 K/UL — HIGH (ref 3.8–10.5)

## 2017-05-22 RX ADMIN — HEPARIN SODIUM 5000 UNIT(S): 5000 INJECTION INTRAVENOUS; SUBCUTANEOUS at 05:29

## 2017-05-22 RX ADMIN — Medication 3 MILLILITER(S): at 05:25

## 2017-05-22 RX ADMIN — Medication 20 MILLIGRAM(S): at 05:25

## 2017-05-22 RX ADMIN — PIPERACILLIN AND TAZOBACTAM 25 GRAM(S): 4; .5 INJECTION, POWDER, LYOPHILIZED, FOR SOLUTION INTRAVENOUS at 13:40

## 2017-05-22 RX ADMIN — NYSTATIN CREAM 1 APPLICATION(S): 100000 CREAM TOPICAL at 05:29

## 2017-05-22 RX ADMIN — Medication 40 MILLIGRAM(S): at 05:28

## 2017-05-22 RX ADMIN — Medication 40 MILLIGRAM(S): at 17:21

## 2017-05-22 RX ADMIN — Medication 25 MILLIGRAM(S): at 05:25

## 2017-05-22 RX ADMIN — Medication 1 DROP(S): at 05:28

## 2017-05-22 RX ADMIN — Medication 1 APPLICATION(S): at 17:31

## 2017-05-22 RX ADMIN — Medication 1 DROP(S): at 21:37

## 2017-05-22 RX ADMIN — Medication 1 DROP(S): at 00:04

## 2017-05-22 RX ADMIN — Medication 3 MILLILITER(S): at 21:37

## 2017-05-22 RX ADMIN — Medication 1 APPLICATION(S): at 05:26

## 2017-05-22 RX ADMIN — AMLODIPINE BESYLATE 2.5 MILLIGRAM(S): 2.5 TABLET ORAL at 05:25

## 2017-05-22 RX ADMIN — Medication 81 MILLIGRAM(S): at 13:08

## 2017-05-22 RX ADMIN — Medication 3 MILLILITER(S): at 13:47

## 2017-05-22 RX ADMIN — RALOXIFENE HYDROCHLORIDE 60 MILLIGRAM(S): 60 TABLET, COATED ORAL at 17:21

## 2017-05-22 RX ADMIN — LISINOPRIL 10 MILLIGRAM(S): 2.5 TABLET ORAL at 05:26

## 2017-05-22 RX ADMIN — PIPERACILLIN AND TAZOBACTAM 25 GRAM(S): 4; .5 INJECTION, POWDER, LYOPHILIZED, FOR SOLUTION INTRAVENOUS at 05:21

## 2017-05-22 RX ADMIN — Medication 100 MILLIGRAM(S): at 05:25

## 2017-05-22 RX ADMIN — Medication 1 DROP(S): at 13:41

## 2017-05-22 RX ADMIN — SIMVASTATIN 20 MILLIGRAM(S): 20 TABLET, FILM COATED ORAL at 21:37

## 2017-05-22 RX ADMIN — Medication 100 MILLIGRAM(S): at 23:41

## 2017-05-22 RX ADMIN — NYSTATIN CREAM 1 APPLICATION(S): 100000 CREAM TOPICAL at 17:21

## 2017-05-22 RX ADMIN — PIPERACILLIN AND TAZOBACTAM 25 GRAM(S): 4; .5 INJECTION, POWDER, LYOPHILIZED, FOR SOLUTION INTRAVENOUS at 21:36

## 2017-05-22 RX ADMIN — HEPARIN SODIUM 5000 UNIT(S): 5000 INJECTION INTRAVENOUS; SUBCUTANEOUS at 17:21

## 2017-05-22 RX ADMIN — Medication 1 DROP(S): at 17:21

## 2017-05-22 NOTE — PHYSICAL THERAPY INITIAL EVALUATION ADULT - IMPAIRED TRANSFERS: SIT/STAND, REHAB EVAL
decreased endurance , min to intermittent mod unsteady when first stand up tends to retropulse ; once get balanced then intermittent min unsteady at rolling walker min of 1/decreased strength/impaired balance/impaired postural control

## 2017-05-22 NOTE — DISCHARGE NOTE ADULT - ADDITIONAL INSTRUCTIONS
Please follow up with Dr. Eugene/Dr. Alaniz within 1 week Please follow up with Dr. Eugene/Dr. Jeffries in  days

## 2017-05-22 NOTE — PHYSICAL THERAPY INITIAL EVALUATION ADULT - GAIT DEVIATIONS NOTED, PT EVAL
decreased stride length/decreased step length/decreased darya/increased time in double stance/decreased weight-shifting ability

## 2017-05-22 NOTE — PHYSICAL THERAPY INITIAL EVALUATION ADULT - IMPAIRMENTS FOUND, PT EVAL
ventilation and respiration/gas exchange/gait, locomotion, and balance/muscle strength/aerobic capacity/endurance

## 2017-05-22 NOTE — DISCHARGE NOTE ADULT - HOSPITAL COURSE
**Attending to complete Patient discharged  continue current meds  activity as tolerated  follow up ,in office  low sodium diet  Patient aware of plan

## 2017-05-22 NOTE — DISCHARGE NOTE ADULT - MEDICATION SUMMARY - MEDICATIONS TO STOP TAKING
I will STOP taking the medications listed below when I get home from the hospital:    potassium chloride 20 mEq oral tablet, extended release  -- 1 tab(s) by mouth once a day    Norvasc 2.5 mg oral tablet  -- 1 tab(s) by mouth once a day    Lasix 20 mg oral tablet  -- 1 tab(s) by mouth once a day (in the evening)    nitrofurantoin macrocrystals-monohydrate 100 mg oral capsule  -- 1 cap(s) by mouth 2 times a day (with meals)

## 2017-05-22 NOTE — PHYSICAL THERAPY INITIAL EVALUATION ADULT - PERTINENT HX OF CURRENT PROBLEM, REHAB EVAL
89yo p/w shortness of breath for 1 day. Pt. reports yellow mucus production with cough. Son reports occasional fevers at home. Denies abdominal pain, chest pain, nausea/vomiting/diarrhea. Pt. using nebulizer at home. Denies any missed medications(lasix). PCP: Ismael August Cards: Broderick León; WBC elevated 18.53; Na 133 ; CT CHEST 5/18/17 : old rib fx's , bilateral DJD spine; severe compression deformities T 8 ( new since 2012)&T11( new since 2015) vertebral bodies, sm B pleural effuse

## 2017-05-22 NOTE — DISCHARGE NOTE ADULT - CARE PLAN
Principal Discharge DX:	Pneumonia  Goal:	Remain free of symptoms  Instructions for follow-up, activity and diet:	Completed antibiotic course  Completed steroid course  Please continue Robitussin as needed   Please follow up with Dr. Eugene/Dr. Alaniz within 1 week    Pneumonia is a lung infection that can cause a fever, cough, and trouble breathing.  Nutrition is important, eat small frequent meals.  Get lots of rest and drink fluids.  Call your health care provider upon arrival home from hospital and make a follow up appointment for one week.  If your cough worsens, you develop fever greater than 101', you have shaking chills, a fast heartbeat, trouble breathing and/or feel your are breathing much faster than usual, call your healthcare provider.  Make sure you wash your hands frequently.  Secondary Diagnosis:	CHF (congestive heart failure)  Instructions for follow-up, activity and diet:	Weigh yourself daily.  If you gain 3lbs in 3 days, or 5lbs in a week call your Health Care Provider.  Do not eat or drink foods containing more than 2000mg of salt (sodium) in your diet every day.  Call your Health Care Provider if you have any swelling or increased swelling in your feet, ankles, and/or stomach.  Take all of your medication as directed.  If you become dizzy call your Health Care Provider.  Secondary Diagnosis:	Essential hypertension  Instructions for follow-up, activity and diet:	Low salt diet  Activity as tolerated.  Take all medication as prescribed.  Follow up with your medical doctor for routine blood pressure monitoring at your next visit.  Notify your doctor if you have any of the following symptoms:   Dizziness, Lightheadedness, Blurry vision, Headache, Chest pain, Shortness of breath  Secondary Diagnosis:	HLD (hyperlipidemia)  Instructions for follow-up, activity and diet:	Continue medications as instructed  Low fat diet Principal Discharge DX:	Pneumonia  Goal:	Remain free of symptoms  Instructions for follow-up, activity and diet:	Completed antibiotic course  Completed steroid course  Please continue Robitussin as needed   Please follow up with Dr. Eugene/Dr. Alaniz within 1 week    Pneumonia is a lung infection that can cause a fever, cough, and trouble breathing.  Nutrition is important, eat small frequent meals.  Get lots of rest and drink fluids.  Call your health care provider upon arrival home from hospital and make a follow up appointment for one week.  If your cough worsens, you develop fever greater than 101', you have shaking chills, a fast heartbeat, trouble breathing and/or feel your are breathing much faster than usual, call your healthcare provider.  Make sure you wash your hands frequently.  Secondary Diagnosis:	CHF (congestive heart failure)  Goal:	stable  Instructions for follow-up, activity and diet:	cont lasix and potassium chloride, Weigh yourself daily.  If you gain 3lbs in 3 days, or 5lbs in a week call your Health Care Provider.  Do not eat or drink foods containing more than 2000mg of salt (sodium) in your diet every day.  Call your Health Care Provider if you have any swelling or increased swelling in your feet, ankles, and/or stomach.  Take all of your medication as directed.  If you become dizzy call your Health Care Provider.  Secondary Diagnosis:	Essential hypertension  Goal:	controlled  Instructions for follow-up, activity and diet:	cont altace, toprol, Low salt diet  Activity as tolerated.  Take all medication as prescribed.  Follow up with your medical doctor for routine blood pressure monitoring at your next visit.  Notify your doctor if you have any of the following symptoms:   Dizziness, Lightheadedness, Blurry vision, Headache, Chest pain, Shortness of breath  Secondary Diagnosis:	HLD (hyperlipidemia)  Goal:	stable  Instructions for follow-up, activity and diet:	Continue medications as instructed  Low fat diet

## 2017-05-22 NOTE — PHYSICAL THERAPY INITIAL EVALUATION ADULT - IMPAIRMENTS CONTRIBUTING TO GAIT DEVIATIONS, PT EVAL
decreased flexibility/impaired balance/decreased endurance , no SOB pulse ox 91-92 % on room air/impaired postural control/decreased strength

## 2017-05-22 NOTE — PHYSICAL THERAPY INITIAL EVALUATION ADULT - DISCHARGE DISPOSITION, PT EVAL
to increase functional mobility , strength, balance, endurance , fall prevention education/home w/ home PT/home w/ assist

## 2017-05-22 NOTE — DISCHARGE NOTE ADULT - PLAN OF CARE
Remain free of symptoms Completed antibiotic course  Completed steroid course  Please continue Robitussin as needed   Please follow up with Dr. Eugene/Dr. Alaniz within 1 week    Pneumonia is a lung infection that can cause a fever, cough, and trouble breathing.  Nutrition is important, eat small frequent meals.  Get lots of rest and drink fluids.  Call your health care provider upon arrival home from hospital and make a follow up appointment for one week.  If your cough worsens, you develop fever greater than 101', you have shaking chills, a fast heartbeat, trouble breathing and/or feel your are breathing much faster than usual, call your healthcare provider.  Make sure you wash your hands frequently. Weigh yourself daily.  If you gain 3lbs in 3 days, or 5lbs in a week call your Health Care Provider.  Do not eat or drink foods containing more than 2000mg of salt (sodium) in your diet every day.  Call your Health Care Provider if you have any swelling or increased swelling in your feet, ankles, and/or stomach.  Take all of your medication as directed.  If you become dizzy call your Health Care Provider. Low salt diet  Activity as tolerated.  Take all medication as prescribed.  Follow up with your medical doctor for routine blood pressure monitoring at your next visit.  Notify your doctor if you have any of the following symptoms:   Dizziness, Lightheadedness, Blurry vision, Headache, Chest pain, Shortness of breath Continue medications as instructed  Low fat diet stable controlled cont lasix and potassium chloride, Weigh yourself daily.  If you gain 3lbs in 3 days, or 5lbs in a week call your Health Care Provider.  Do not eat or drink foods containing more than 2000mg of salt (sodium) in your diet every day.  Call your Health Care Provider if you have any swelling or increased swelling in your feet, ankles, and/or stomach.  Take all of your medication as directed.  If you become dizzy call your Health Care Provider. cont altace, toprol, Low salt diet  Activity as tolerated.  Take all medication as prescribed.  Follow up with your medical doctor for routine blood pressure monitoring at your next visit.  Notify your doctor if you have any of the following symptoms:   Dizziness, Lightheadedness, Blurry vision, Headache, Chest pain, Shortness of breath

## 2017-05-22 NOTE — PHYSICAL THERAPY INITIAL EVALUATION ADULT - BED MOBILITY LIMITATIONS, REHAB EVAL
sat x few min bed soft with presure relief mattress making harder for pt to balance , balanced once feet on floor with close supervision/decreased ability to use legs for bridging/pushing/decreased ability to use arms for pushing/pulling

## 2017-05-22 NOTE — PHYSICAL THERAPY INITIAL EVALUATION ADULT - IMPAIRMENTS CONTRIBUTING IMPAIRED BED MOBILITY, REHAB EVAL
decreased endurance , no SOB on room air 91-92 %/decreased strength/impaired balance/impaired postural control

## 2017-05-22 NOTE — PHYSICAL THERAPY INITIAL EVALUATION ADULT - ASR EQUIP NEEDS DISCH PT EVAL
pt has w/c , rolling walker , commode , HHA 24/7 fri through Mon and 12 hr HHA tues thru thurs then rest of time son Ziggy covers hrs so always 24/7 coverage

## 2017-05-22 NOTE — DISCHARGE NOTE ADULT - PATIENT PORTAL LINK FT
“You can access the Follow[facility] Patient Portal, offered by [facility], by registering with the following website: [email or web address]”

## 2017-05-22 NOTE — DISCHARGE NOTE ADULT - CARE PROVIDER_API CALL
Ismael Eugene), Geriatric Medicine; Internal Medicine  [address], [place] [id number]  Phone: [phone]  Fax: [phone] Ismael Eugene), Geriatric Medicine; Internal Medicine  [address], [place] [id number]  Phone: [phone]  Fax: [phone]

## 2017-05-22 NOTE — DISCHARGE NOTE ADULT - MEDICATION SUMMARY - MEDICATIONS TO TAKE
I will START or STAY ON the medications listed below when I get home from the hospital:    aspirin 81 mg oral delayed release tablet  -- 1 tab(s) by mouth once a day  -- Indication: For CAD    ramipril 2.5 mg oral capsule  -- 1 cap(s) by mouth once a day  -- Indication: For Essential hypertension    Zocor 20 mg oral tablet  -- 1 tab(s) by mouth once a day (at bedtime)  -- Indication: For HLD (hyperlipidemia)    Evista 60 mg oral tablet  -- 1 tab(s) by mouth once a day  -- Indication: For osteoporosis    Toprol-XL  -- 12.5 milligram(s) by mouth once a day  -- Indication: For Essential hypertension    Lasix 40 mg oral tablet  -- 1 tab(s) by mouth once a day (in the morning)  -- Indication: For CHF (congestive heart failure)    guaiFENesin 100 mg/5 mL oral liquid  -- 5 milliliter(s) by mouth every 6 hours  -- Indication: For Cough    potassium chloride 20 mEq oral tablet, extended release  -- 1 tab(s) by mouth once a day  -- Indication: For Prophylactic    tobramycin 0.3% ophthalmic solution  -- 1 drop(s) to each affected eye 4 times a day  -- Indication: For Corneal ulcer    dexamethasone/neomycin/polymyxin B 1 mg-3.5 mg-10,000 units/g ophthalmic ointment  -- 1 application to each affected eye 2 times a day  -- Indication: For Corneal ulcer    Aciphex 20 mg oral delayed release tablet  -- 1 tab(s) by mouth once a day  -- Indication: For GERD    Calcium 600+D  -- 1 tab(s) by mouth 2 times a day  -- Indication: For Osteoporosis I will START or STAY ON the medications listed below when I get home from the hospital:    aspirin 81 mg oral delayed release tablet  -- 1 tab(s) by mouth once a day  -- Indication: For CAD    ramipril 2.5 mg oral capsule  -- 1 cap(s) by mouth once a day  -- Indication: For Essential hypertension    Zocor 20 mg oral tablet  -- 1 tab(s) by mouth once a day (at bedtime)  -- Indication: For HLD (hyperlipidemia)    Evista 60 mg oral tablet  -- 1 tab(s) by mouth once a day  -- Indication: For osteoporosis    Toprol-XL  -- 12.5 milligram(s) by mouth once a day  -- Indication: For Essential hypertension    Lasix 40 mg oral tablet  -- 1 tab(s) by mouth once a day (in the morning)  -- Indication: For CHF (congestive heart failure)    guaiFENesin 100 mg/5 mL oral liquid  -- 5 milliliter(s) by mouth every 6 hours  -- Indication: For Cough    potassium chloride 20 mEq oral tablet, extended release  -- 1 tab(s) by mouth once a day  -- Indication: For CHF (congestive heart failure)    tobramycin 0.3% ophthalmic solution  -- 1 drop(s) to each affected eye 4 times a day  -- Indication: For Corneal ulcer    dexamethasone/neomycin/polymyxin B 1 mg-3.5 mg-10,000 units/g ophthalmic ointment  -- 1 application to each affected eye 2 times a day  -- Indication: For Corneal ulcer    Aciphex 20 mg oral delayed release tablet  -- 1 tab(s) by mouth once a day  -- Indication: For GERD    Calcium 600+D  -- 1 tab(s) by mouth 2 times a day  -- Indication: For Osteoporosis

## 2017-05-22 NOTE — PHYSICAL THERAPY INITIAL EVALUATION ADULT - TRANSFER SAFETY CONCERNS NOTED: SIT/STAND, REHAB EVAL
losing balance/decreased step length/decreased balance during turns/decreased weight-shifting ability

## 2017-05-22 NOTE — DISCHARGE NOTE ADULT - HOME CARE AGENCY
[facility] services. Nurse and Physical therapist to arrange visit within 48 hrs after your discharged home. [phone]

## 2017-05-22 NOTE — PHYSICAL THERAPY INITIAL EVALUATION ADULT - PRECAUTIONS/LIMITATIONS, REHAB EVAL
possible pna ; EKG NSR with RBBB5/17/17 ; TTE 5/19/17 : mild -mod MR/ moderate AS,Dilated LA , and diastolic dysfunction , minimal MR possible pna ; EKG NSR with RBBB5/17/17 ; TTE 5/19/17 : mild -mod MR/ moderate AS,Dilated LA , and diastolic dysfunction , minimal MR/fall precautions

## 2017-05-22 NOTE — PHYSICAL THERAPY INITIAL EVALUATION ADULT - STAIR PATTERN, REHAB EVAL
step to step/2 hands on rail , intermittent min unsteady pvt HHA understood and demo how to guard properly on steps and amb with rolling walker

## 2017-05-22 NOTE — PHYSICAL THERAPY INITIAL EVALUATION ADULT - GENERAL OBSERVATIONS, REHAB EVAL
pt received in bedside recliner with pvt BASHIR bhandari present and rn Pria present ; per rn pt amb to BR with assist and rolling walker today intermittent min unsteady ; skin fragile diffuse ecchymotic small areas ue's

## 2017-05-22 NOTE — PHYSICAL THERAPY INITIAL EVALUATION ADULT - ADDITIONAL COMMENTS
pt lives with son in house with 3-5 steps to enter bedroom on second level with rail ; pt uses rolling walker and w/c ; pt has Private HHA 7 days /wk for 12 hrs each day ; pt uses nebulizer at home ; beth Trinh is identified as caregiver ( [phone]) pt lives with son in house with 3-5 steps with bilateral rails  to enter;  bedroom on main level ; shower is upstairs so has been sponge bathing with assist of HHA ; pt uses rolling walker and w/c ; pt has Private HHA 7 days /wk for 12 hrs each day ; pt uses nebulizer at home ; son Ziggy is identified as caregiver ( [phone])

## 2017-05-23 LAB
ANION GAP SERPL CALC-SCNC: 13 MMOL/L — SIGNIFICANT CHANGE UP (ref 5–17)
BUN SERPL-MCNC: 22 MG/DL — SIGNIFICANT CHANGE UP (ref 7–23)
CALCIUM SERPL-MCNC: 9.2 MG/DL — SIGNIFICANT CHANGE UP (ref 8.4–10.5)
CHLORIDE SERPL-SCNC: 98 MMOL/L — SIGNIFICANT CHANGE UP (ref 96–108)
CO2 SERPL-SCNC: 26 MMOL/L — SIGNIFICANT CHANGE UP (ref 22–31)
CREAT SERPL-MCNC: 0.85 MG/DL — SIGNIFICANT CHANGE UP (ref 0.5–1.3)
CULTURE RESULTS: SIGNIFICANT CHANGE UP
CULTURE RESULTS: SIGNIFICANT CHANGE UP
GLUCOSE SERPL-MCNC: 110 MG/DL — HIGH (ref 70–99)
HCT VFR BLD CALC: 36.4 % — SIGNIFICANT CHANGE UP (ref 34.5–45)
HGB BLD-MCNC: 11.8 G/DL — SIGNIFICANT CHANGE UP (ref 11.5–15.5)
MCHC RBC-ENTMCNC: 30.3 PG — SIGNIFICANT CHANGE UP (ref 27–34)
MCHC RBC-ENTMCNC: 32.5 GM/DL — SIGNIFICANT CHANGE UP (ref 32–36)
MCV RBC AUTO: 93.3 FL — SIGNIFICANT CHANGE UP (ref 80–100)
PLATELET # BLD AUTO: 317 K/UL — SIGNIFICANT CHANGE UP (ref 150–400)
POTASSIUM SERPL-MCNC: 5 MMOL/L — SIGNIFICANT CHANGE UP (ref 3.5–5.3)
POTASSIUM SERPL-SCNC: 5 MMOL/L — SIGNIFICANT CHANGE UP (ref 3.5–5.3)
RBC # BLD: 3.9 M/UL — SIGNIFICANT CHANGE UP (ref 3.8–5.2)
RBC # FLD: 12.6 % — SIGNIFICANT CHANGE UP (ref 10.3–14.5)
SODIUM SERPL-SCNC: 137 MMOL/L — SIGNIFICANT CHANGE UP (ref 135–145)
SPECIMEN SOURCE: SIGNIFICANT CHANGE UP
SPECIMEN SOURCE: SIGNIFICANT CHANGE UP
WBC # BLD: 14.5 K/UL — HIGH (ref 3.8–10.5)
WBC # FLD AUTO: 14.5 K/UL — HIGH (ref 3.8–10.5)

## 2017-05-23 RX ADMIN — Medication 40 MILLIGRAM(S): at 05:17

## 2017-05-23 RX ADMIN — Medication 1 APPLICATION(S): at 05:17

## 2017-05-23 RX ADMIN — Medication 3 MILLILITER(S): at 21:35

## 2017-05-23 RX ADMIN — PIPERACILLIN AND TAZOBACTAM 25 GRAM(S): 4; .5 INJECTION, POWDER, LYOPHILIZED, FOR SOLUTION INTRAVENOUS at 05:15

## 2017-05-23 RX ADMIN — Medication 25 MILLIGRAM(S): at 05:16

## 2017-05-23 RX ADMIN — NYSTATIN CREAM 1 APPLICATION(S): 100000 CREAM TOPICAL at 21:35

## 2017-05-23 RX ADMIN — NYSTATIN CREAM 1 APPLICATION(S): 100000 CREAM TOPICAL at 05:16

## 2017-05-23 RX ADMIN — Medication 1 APPLICATION(S): at 18:41

## 2017-05-23 RX ADMIN — PIPERACILLIN AND TAZOBACTAM 25 GRAM(S): 4; .5 INJECTION, POWDER, LYOPHILIZED, FOR SOLUTION INTRAVENOUS at 13:31

## 2017-05-23 RX ADMIN — Medication 3 MILLILITER(S): at 05:16

## 2017-05-23 RX ADMIN — Medication 40 MILLIGRAM(S): at 18:40

## 2017-05-23 RX ADMIN — AMLODIPINE BESYLATE 2.5 MILLIGRAM(S): 2.5 TABLET ORAL at 05:16

## 2017-05-23 RX ADMIN — LISINOPRIL 10 MILLIGRAM(S): 2.5 TABLET ORAL at 05:16

## 2017-05-23 RX ADMIN — PIPERACILLIN AND TAZOBACTAM 25 GRAM(S): 4; .5 INJECTION, POWDER, LYOPHILIZED, FOR SOLUTION INTRAVENOUS at 21:35

## 2017-05-23 RX ADMIN — Medication 3 MILLILITER(S): at 13:26

## 2017-05-23 RX ADMIN — SIMVASTATIN 20 MILLIGRAM(S): 20 TABLET, FILM COATED ORAL at 21:35

## 2017-05-23 RX ADMIN — Medication 1 DROP(S): at 18:40

## 2017-05-23 RX ADMIN — Medication 1 DROP(S): at 05:16

## 2017-05-23 RX ADMIN — Medication 20 MILLIGRAM(S): at 05:16

## 2017-05-23 RX ADMIN — Medication 81 MILLIGRAM(S): at 13:26

## 2017-05-23 RX ADMIN — HEPARIN SODIUM 5000 UNIT(S): 5000 INJECTION INTRAVENOUS; SUBCUTANEOUS at 18:40

## 2017-05-23 RX ADMIN — RALOXIFENE HYDROCHLORIDE 60 MILLIGRAM(S): 60 TABLET, COATED ORAL at 13:26

## 2017-05-23 RX ADMIN — Medication 1 DROP(S): at 13:26

## 2017-05-23 RX ADMIN — HEPARIN SODIUM 5000 UNIT(S): 5000 INJECTION INTRAVENOUS; SUBCUTANEOUS at 05:17

## 2017-05-23 NOTE — DIETITIAN INITIAL EVALUATION ADULT. - OTHER INFO
Pt reports stable weight for ~3 years 90-94 lbs, will weigh herself weekly and more recently has been ~92 lbs, noted current admit weight 93.4 lbs (5/18). Pt with no food allergies, takes a multivitamin at home, no N+V, last BM today, no previous difficulty with bowel movements at home. Pt with no difficulty chewing/swallowing, has partial upper dentures which fit her well. In house pt reports ongoing good appetite consuming ~75% of meals, confirmed by health aide, noted % intake x 2 meals per flowsheets.

## 2017-05-23 NOTE — DIETITIAN INITIAL EVALUATION ADULT. - NS AS NUTRI INTERV ED CONTENT
Reviewed low sodium diet education with pt and health aide including not adding additional salt to foods, limiting processed foods, cooking with more herbs/spices

## 2017-05-23 NOTE — DIETITIAN INITIAL EVALUATION ADULT. - NS AS NUTRI INTERV MEALS SNACK
1. Consider liberalizing diet to low sodium given advanced age to further optimize intake, 2. Continue to encourage po intake and obtain/honor food preferences as able, 3. Monitor PO intake, diet tolerance, weight trends, labs, and skin integrity, 4. RD to remain available as needed

## 2017-05-23 NOTE — DIETITIAN INITIAL EVALUATION ADULT. - ORAL INTAKE PTA
Pt reports eating well with good appetite, no recent changes in appetite or intake. Pt consumes meals prepared by health aide, diet recall: breakfast: farina or cornflakes c milk or muffin, juice, Lunch: soup and sandwich, Dinner: protein, starch, veggie/good

## 2017-05-23 NOTE — DIETITIAN INITIAL EVALUATION ADULT. - ENERGY NEEDS
Pt seen for BMI <19, per pt height of 5' is inaccurate, health aide at bedside states pt's actual height is closer to 4'10", new BMI based on this is 19.5. Pt with PMH including biventricular heart failure, CAD, CHF, HTN, HLD admitted c SOB, found to have pneumonia-on antibiotics.     Ht:4'10" , Wt:93.4 lbs, BMI:19.5 kg/m2, IBW:96 lbs +/- 10%, %IBW: 97%  No edema, Skin intact.

## 2017-05-24 LAB
ANION GAP SERPL CALC-SCNC: 17 MMOL/L — SIGNIFICANT CHANGE UP (ref 5–17)
BUN SERPL-MCNC: 24 MG/DL — HIGH (ref 7–23)
CALCIUM SERPL-MCNC: 9 MG/DL — SIGNIFICANT CHANGE UP (ref 8.4–10.5)
CHLORIDE SERPL-SCNC: 100 MMOL/L — SIGNIFICANT CHANGE UP (ref 96–108)
CO2 SERPL-SCNC: 23 MMOL/L — SIGNIFICANT CHANGE UP (ref 22–31)
CREAT SERPL-MCNC: 1.02 MG/DL — SIGNIFICANT CHANGE UP (ref 0.5–1.3)
GLUCOSE SERPL-MCNC: 114 MG/DL — HIGH (ref 70–99)
HCT VFR BLD CALC: 37.1 % — SIGNIFICANT CHANGE UP (ref 34.5–45)
HGB BLD-MCNC: 12.3 G/DL — SIGNIFICANT CHANGE UP (ref 11.5–15.5)
MCHC RBC-ENTMCNC: 29.4 PG — SIGNIFICANT CHANGE UP (ref 27–34)
MCHC RBC-ENTMCNC: 33.2 GM/DL — SIGNIFICANT CHANGE UP (ref 32–36)
MCV RBC AUTO: 88.5 FL — SIGNIFICANT CHANGE UP (ref 80–100)
PLATELET # BLD AUTO: 375 K/UL — SIGNIFICANT CHANGE UP (ref 150–400)
POTASSIUM SERPL-MCNC: 5.1 MMOL/L — SIGNIFICANT CHANGE UP (ref 3.5–5.3)
POTASSIUM SERPL-SCNC: 5.1 MMOL/L — SIGNIFICANT CHANGE UP (ref 3.5–5.3)
RBC # BLD: 4.19 M/UL — SIGNIFICANT CHANGE UP (ref 3.8–5.2)
RBC # FLD: 14.6 % — HIGH (ref 10.3–14.5)
SODIUM SERPL-SCNC: 140 MMOL/L — SIGNIFICANT CHANGE UP (ref 135–145)
WBC # BLD: 12.98 K/UL — HIGH (ref 3.8–10.5)
WBC # FLD AUTO: 12.98 K/UL — HIGH (ref 3.8–10.5)

## 2017-05-24 RX ORDER — DOCUSATE SODIUM 100 MG
100 CAPSULE ORAL
Qty: 0 | Refills: 0 | Status: DISCONTINUED | OUTPATIENT
Start: 2017-05-24 | End: 2017-05-26

## 2017-05-24 RX ADMIN — Medication 40 MILLIGRAM(S): at 05:03

## 2017-05-24 RX ADMIN — Medication 1 DROP(S): at 23:56

## 2017-05-24 RX ADMIN — Medication 1 APPLICATION(S): at 17:44

## 2017-05-24 RX ADMIN — PIPERACILLIN AND TAZOBACTAM 25 GRAM(S): 4; .5 INJECTION, POWDER, LYOPHILIZED, FOR SOLUTION INTRAVENOUS at 21:44

## 2017-05-24 RX ADMIN — Medication 1 DROP(S): at 12:48

## 2017-05-24 RX ADMIN — Medication 20 MILLIGRAM(S): at 05:05

## 2017-05-24 RX ADMIN — Medication 81 MILLIGRAM(S): at 12:48

## 2017-05-24 RX ADMIN — RALOXIFENE HYDROCHLORIDE 60 MILLIGRAM(S): 60 TABLET, COATED ORAL at 12:48

## 2017-05-24 RX ADMIN — Medication 25 MILLIGRAM(S): at 05:05

## 2017-05-24 RX ADMIN — HEPARIN SODIUM 5000 UNIT(S): 5000 INJECTION INTRAVENOUS; SUBCUTANEOUS at 17:43

## 2017-05-24 RX ADMIN — AMLODIPINE BESYLATE 2.5 MILLIGRAM(S): 2.5 TABLET ORAL at 05:05

## 2017-05-24 RX ADMIN — Medication 1 APPLICATION(S): at 05:05

## 2017-05-24 RX ADMIN — NYSTATIN CREAM 1 APPLICATION(S): 100000 CREAM TOPICAL at 17:44

## 2017-05-24 RX ADMIN — Medication 3 MILLILITER(S): at 05:02

## 2017-05-24 RX ADMIN — PIPERACILLIN AND TAZOBACTAM 25 GRAM(S): 4; .5 INJECTION, POWDER, LYOPHILIZED, FOR SOLUTION INTRAVENOUS at 05:04

## 2017-05-24 RX ADMIN — Medication 3 MILLILITER(S): at 21:45

## 2017-05-24 RX ADMIN — Medication 1 DROP(S): at 17:45

## 2017-05-24 RX ADMIN — SIMVASTATIN 20 MILLIGRAM(S): 20 TABLET, FILM COATED ORAL at 21:45

## 2017-05-24 RX ADMIN — Medication 1 DROP(S): at 05:04

## 2017-05-24 RX ADMIN — LISINOPRIL 10 MILLIGRAM(S): 2.5 TABLET ORAL at 05:05

## 2017-05-24 RX ADMIN — NYSTATIN CREAM 1 APPLICATION(S): 100000 CREAM TOPICAL at 05:04

## 2017-05-24 RX ADMIN — Medication 1 DROP(S): at 00:34

## 2017-05-24 RX ADMIN — PIPERACILLIN AND TAZOBACTAM 25 GRAM(S): 4; .5 INJECTION, POWDER, LYOPHILIZED, FOR SOLUTION INTRAVENOUS at 12:47

## 2017-05-24 RX ADMIN — Medication 5 MILLIGRAM(S): at 21:45

## 2017-05-24 RX ADMIN — HEPARIN SODIUM 5000 UNIT(S): 5000 INJECTION INTRAVENOUS; SUBCUTANEOUS at 05:04

## 2017-05-24 RX ADMIN — Medication 40 MILLIGRAM(S): at 17:43

## 2017-05-24 RX ADMIN — Medication 3 MILLILITER(S): at 15:02

## 2017-05-25 LAB
ALBUMIN SERPL ELPH-MCNC: 3.3 G/DL — SIGNIFICANT CHANGE UP (ref 3.3–5)
ALP SERPL-CCNC: 71 U/L — SIGNIFICANT CHANGE UP (ref 40–120)
ALT FLD-CCNC: 24 U/L — SIGNIFICANT CHANGE UP (ref 10–45)
ANION GAP SERPL CALC-SCNC: 17 MMOL/L — SIGNIFICANT CHANGE UP (ref 5–17)
AST SERPL-CCNC: 26 U/L — SIGNIFICANT CHANGE UP (ref 10–40)
BASOPHILS # BLD AUTO: 0 K/UL — SIGNIFICANT CHANGE UP (ref 0–0.2)
BASOPHILS NFR BLD AUTO: 0 % — SIGNIFICANT CHANGE UP (ref 0–2)
BILIRUB SERPL-MCNC: 0.4 MG/DL — SIGNIFICANT CHANGE UP (ref 0.2–1.2)
BUN SERPL-MCNC: 26 MG/DL — HIGH (ref 7–23)
CALCIUM SERPL-MCNC: 8.9 MG/DL — SIGNIFICANT CHANGE UP (ref 8.4–10.5)
CHLORIDE SERPL-SCNC: 97 MMOL/L — SIGNIFICANT CHANGE UP (ref 96–108)
CO2 SERPL-SCNC: 22 MMOL/L — SIGNIFICANT CHANGE UP (ref 22–31)
CREAT SERPL-MCNC: 1 MG/DL — SIGNIFICANT CHANGE UP (ref 0.5–1.3)
EOSINOPHIL # BLD AUTO: 0 K/UL — SIGNIFICANT CHANGE UP (ref 0–0.5)
EOSINOPHIL NFR BLD AUTO: 0 % — SIGNIFICANT CHANGE UP (ref 0–6)
GLUCOSE SERPL-MCNC: 104 MG/DL — HIGH (ref 70–99)
HCT VFR BLD CALC: 37.5 % — SIGNIFICANT CHANGE UP (ref 34.5–45)
HGB BLD-MCNC: 12.2 G/DL — SIGNIFICANT CHANGE UP (ref 11.5–15.5)
IMM GRANULOCYTES NFR BLD AUTO: 1.1 % — SIGNIFICANT CHANGE UP (ref 0–1.5)
LYMPHOCYTES # BLD AUTO: 0.95 K/UL — LOW (ref 1–3.3)
LYMPHOCYTES # BLD AUTO: 7 % — LOW (ref 13–44)
MCHC RBC-ENTMCNC: 29 PG — SIGNIFICANT CHANGE UP (ref 27–34)
MCHC RBC-ENTMCNC: 32.5 GM/DL — SIGNIFICANT CHANGE UP (ref 32–36)
MCV RBC AUTO: 89.1 FL — SIGNIFICANT CHANGE UP (ref 80–100)
MONOCYTES # BLD AUTO: 0.45 K/UL — SIGNIFICANT CHANGE UP (ref 0–0.9)
MONOCYTES NFR BLD AUTO: 3.3 % — SIGNIFICANT CHANGE UP (ref 2–14)
NEUTROPHILS # BLD AUTO: 12 K/UL — HIGH (ref 1.8–7.4)
NEUTROPHILS NFR BLD AUTO: 88.6 % — HIGH (ref 43–77)
PLATELET # BLD AUTO: 385 K/UL — SIGNIFICANT CHANGE UP (ref 150–400)
POTASSIUM SERPL-MCNC: 4.6 MMOL/L — SIGNIFICANT CHANGE UP (ref 3.5–5.3)
POTASSIUM SERPL-SCNC: 4.6 MMOL/L — SIGNIFICANT CHANGE UP (ref 3.5–5.3)
PROT SERPL-MCNC: 6.1 G/DL — SIGNIFICANT CHANGE UP (ref 6–8.3)
RBC # BLD: 4.21 M/UL — SIGNIFICANT CHANGE UP (ref 3.8–5.2)
RBC # FLD: 14.5 % — SIGNIFICANT CHANGE UP (ref 10.3–14.5)
SODIUM SERPL-SCNC: 136 MMOL/L — SIGNIFICANT CHANGE UP (ref 135–145)
WBC # BLD: 13.55 K/UL — HIGH (ref 3.8–10.5)
WBC # FLD AUTO: 13.55 K/UL — HIGH (ref 3.8–10.5)

## 2017-05-25 PROCEDURE — 71010: CPT | Mod: 26

## 2017-05-25 RX ADMIN — HEPARIN SODIUM 5000 UNIT(S): 5000 INJECTION INTRAVENOUS; SUBCUTANEOUS at 05:02

## 2017-05-25 RX ADMIN — Medication 81 MILLIGRAM(S): at 12:20

## 2017-05-25 RX ADMIN — AMLODIPINE BESYLATE 2.5 MILLIGRAM(S): 2.5 TABLET ORAL at 05:05

## 2017-05-25 RX ADMIN — Medication 1 APPLICATION(S): at 21:36

## 2017-05-25 RX ADMIN — RALOXIFENE HYDROCHLORIDE 60 MILLIGRAM(S): 60 TABLET, COATED ORAL at 12:20

## 2017-05-25 RX ADMIN — Medication 1 DROP(S): at 23:47

## 2017-05-25 RX ADMIN — NYSTATIN CREAM 1 APPLICATION(S): 100000 CREAM TOPICAL at 19:56

## 2017-05-25 RX ADMIN — PIPERACILLIN AND TAZOBACTAM 25 GRAM(S): 4; .5 INJECTION, POWDER, LYOPHILIZED, FOR SOLUTION INTRAVENOUS at 05:02

## 2017-05-25 RX ADMIN — SIMVASTATIN 20 MILLIGRAM(S): 20 TABLET, FILM COATED ORAL at 21:35

## 2017-05-25 RX ADMIN — Medication 1 APPLICATION(S): at 05:02

## 2017-05-25 RX ADMIN — Medication 3 MILLILITER(S): at 05:05

## 2017-05-25 RX ADMIN — Medication 3 MILLILITER(S): at 15:56

## 2017-05-25 RX ADMIN — Medication 40 MILLIGRAM(S): at 05:02

## 2017-05-25 RX ADMIN — Medication 1 DROP(S): at 18:29

## 2017-05-25 RX ADMIN — LISINOPRIL 10 MILLIGRAM(S): 2.5 TABLET ORAL at 05:05

## 2017-05-25 RX ADMIN — Medication 20 MILLIGRAM(S): at 05:05

## 2017-05-25 RX ADMIN — NYSTATIN CREAM 1 APPLICATION(S): 100000 CREAM TOPICAL at 05:04

## 2017-05-25 RX ADMIN — Medication 1 DROP(S): at 05:02

## 2017-05-25 RX ADMIN — Medication 100 MILLIGRAM(S): at 18:29

## 2017-05-25 RX ADMIN — Medication 25 MILLIGRAM(S): at 05:05

## 2017-05-25 RX ADMIN — Medication 1 DROP(S): at 12:20

## 2017-05-25 RX ADMIN — Medication 5 MILLIGRAM(S): at 21:35

## 2017-05-25 RX ADMIN — Medication 3 MILLILITER(S): at 21:35

## 2017-05-26 VITALS — SYSTOLIC BLOOD PRESSURE: 110 MMHG | DIASTOLIC BLOOD PRESSURE: 70 MMHG

## 2017-05-26 PROCEDURE — 96374 THER/PROPH/DIAG INJ IV PUSH: CPT

## 2017-05-26 PROCEDURE — 99285 EMERGENCY DEPT VISIT HI MDM: CPT | Mod: 25

## 2017-05-26 PROCEDURE — 85730 THROMBOPLASTIN TIME PARTIAL: CPT

## 2017-05-26 PROCEDURE — 71250 CT THORAX DX C-: CPT

## 2017-05-26 PROCEDURE — 85610 PROTHROMBIN TIME: CPT

## 2017-05-26 PROCEDURE — 94640 AIRWAY INHALATION TREATMENT: CPT

## 2017-05-26 PROCEDURE — 83880 ASSAY OF NATRIURETIC PEPTIDE: CPT

## 2017-05-26 PROCEDURE — 87633 RESP VIRUS 12-25 TARGETS: CPT

## 2017-05-26 PROCEDURE — 93005 ELECTROCARDIOGRAM TRACING: CPT

## 2017-05-26 PROCEDURE — 84132 ASSAY OF SERUM POTASSIUM: CPT

## 2017-05-26 PROCEDURE — 82947 ASSAY GLUCOSE BLOOD QUANT: CPT

## 2017-05-26 PROCEDURE — 80053 COMPREHEN METABOLIC PANEL: CPT

## 2017-05-26 PROCEDURE — 80048 BASIC METABOLIC PNL TOTAL CA: CPT

## 2017-05-26 PROCEDURE — 82330 ASSAY OF CALCIUM: CPT

## 2017-05-26 PROCEDURE — 83605 ASSAY OF LACTIC ACID: CPT

## 2017-05-26 PROCEDURE — 97116 GAIT TRAINING THERAPY: CPT

## 2017-05-26 PROCEDURE — 87040 BLOOD CULTURE FOR BACTERIA: CPT

## 2017-05-26 PROCEDURE — 97110 THERAPEUTIC EXERCISES: CPT

## 2017-05-26 PROCEDURE — 85014 HEMATOCRIT: CPT

## 2017-05-26 PROCEDURE — 97162 PT EVAL MOD COMPLEX 30 MIN: CPT

## 2017-05-26 PROCEDURE — 82803 BLOOD GASES ANY COMBINATION: CPT

## 2017-05-26 PROCEDURE — 80202 ASSAY OF VANCOMYCIN: CPT

## 2017-05-26 PROCEDURE — 87798 DETECT AGENT NOS DNA AMP: CPT

## 2017-05-26 PROCEDURE — 87070 CULTURE OTHR SPECIMN AEROBIC: CPT

## 2017-05-26 PROCEDURE — 82435 ASSAY OF BLOOD CHLORIDE: CPT

## 2017-05-26 PROCEDURE — 87486 CHLMYD PNEUM DNA AMP PROBE: CPT

## 2017-05-26 PROCEDURE — 93306 TTE W/DOPPLER COMPLETE: CPT

## 2017-05-26 PROCEDURE — 84295 ASSAY OF SERUM SODIUM: CPT

## 2017-05-26 PROCEDURE — 71045 X-RAY EXAM CHEST 1 VIEW: CPT

## 2017-05-26 PROCEDURE — 85027 COMPLETE CBC AUTOMATED: CPT

## 2017-05-26 PROCEDURE — 87581 M.PNEUMON DNA AMP PROBE: CPT

## 2017-05-26 RX ORDER — TOBRAMYCIN 0.3 %
1 DROPS OPHTHALMIC (EYE)
Qty: 0 | Refills: 0 | COMMUNITY
Start: 2017-05-26

## 2017-05-26 RX ORDER — POTASSIUM CHLORIDE 20 MEQ
1 PACKET (EA) ORAL
Qty: 30 | Refills: 0 | OUTPATIENT
Start: 2017-05-26 | End: 2017-06-25

## 2017-05-26 RX ORDER — NEOMYCIN/POLYMYXIN B/DEXAMETHA 0.1 %
1 SUSPENSION, DROPS(FINAL DOSAGE FORM)(ML) OPHTHALMIC (EYE)
Qty: 0 | Refills: 0 | COMMUNITY
Start: 2017-05-26

## 2017-05-26 RX ORDER — SODIUM CHLORIDE 9 MG/ML
500 INJECTION INTRAMUSCULAR; INTRAVENOUS; SUBCUTANEOUS ONCE
Qty: 0 | Refills: 0 | Status: COMPLETED | OUTPATIENT
Start: 2017-05-26 | End: 2017-05-26

## 2017-05-26 RX ADMIN — Medication 1 APPLICATION(S): at 05:34

## 2017-05-26 RX ADMIN — HEPARIN SODIUM 5000 UNIT(S): 5000 INJECTION INTRAVENOUS; SUBCUTANEOUS at 05:35

## 2017-05-26 RX ADMIN — SODIUM CHLORIDE 1000 MILLILITER(S): 9 INJECTION INTRAMUSCULAR; INTRAVENOUS; SUBCUTANEOUS at 17:03

## 2017-05-26 RX ADMIN — Medication 100 MILLIGRAM(S): at 05:33

## 2017-05-26 RX ADMIN — Medication 81 MILLIGRAM(S): at 12:56

## 2017-05-26 RX ADMIN — Medication 1 DROP(S): at 05:34

## 2017-05-26 RX ADMIN — LISINOPRIL 10 MILLIGRAM(S): 2.5 TABLET ORAL at 05:33

## 2017-05-26 RX ADMIN — Medication 20 MILLIGRAM(S): at 05:33

## 2017-05-26 RX ADMIN — Medication 25 MILLIGRAM(S): at 05:34

## 2017-05-26 RX ADMIN — Medication 3 MILLILITER(S): at 05:33

## 2017-05-26 RX ADMIN — Medication 1 DROP(S): at 12:56

## 2017-05-26 RX ADMIN — AMLODIPINE BESYLATE 2.5 MILLIGRAM(S): 2.5 TABLET ORAL at 05:34

## 2017-05-26 RX ADMIN — RALOXIFENE HYDROCHLORIDE 60 MILLIGRAM(S): 60 TABLET, COATED ORAL at 12:56

## 2017-07-12 ENCOUNTER — INPATIENT (INPATIENT)
Facility: HOSPITAL | Age: 82
LOS: 8 days | Discharge: HOME HEALTH | End: 2017-07-21
Attending: SURGERY | Admitting: SURGERY
Payer: MEDICARE

## 2017-07-12 VITALS
RESPIRATION RATE: 16 BRPM | DIASTOLIC BLOOD PRESSURE: 78 MMHG | TEMPERATURE: 98 F | OXYGEN SATURATION: 92 % | HEART RATE: 92 BPM | SYSTOLIC BLOOD PRESSURE: 138 MMHG

## 2017-07-12 LAB
ALBUMIN SERPL ELPH-MCNC: 3.9 G/DL — SIGNIFICANT CHANGE UP (ref 3.3–5)
ALP SERPL-CCNC: 91 U/L — SIGNIFICANT CHANGE UP (ref 40–120)
ALT FLD-CCNC: 25 U/L — SIGNIFICANT CHANGE UP (ref 4–33)
AST SERPL-CCNC: 57 U/L — HIGH (ref 4–32)
BASE EXCESS BLDV CALC-SCNC: 3.1 MMOL/L — SIGNIFICANT CHANGE UP
BASOPHILS # BLD AUTO: 0.07 K/UL — SIGNIFICANT CHANGE UP (ref 0–0.2)
BASOPHILS NFR BLD AUTO: 0.4 % — SIGNIFICANT CHANGE UP (ref 0–2)
BILIRUB SERPL-MCNC: 0.6 MG/DL — SIGNIFICANT CHANGE UP (ref 0.2–1.2)
BLOOD GAS VENOUS - CREATININE: 0.69 MG/DL — SIGNIFICANT CHANGE UP (ref 0.5–1.3)
BUN SERPL-MCNC: 22 MG/DL — SIGNIFICANT CHANGE UP (ref 7–23)
CALCIUM SERPL-MCNC: 9.4 MG/DL — SIGNIFICANT CHANGE UP (ref 8.4–10.5)
CHLORIDE BLDV-SCNC: 105 MMOL/L — SIGNIFICANT CHANGE UP (ref 96–108)
CHLORIDE SERPL-SCNC: 96 MMOL/L — LOW (ref 98–107)
CO2 SERPL-SCNC: 22 MMOL/L — SIGNIFICANT CHANGE UP (ref 22–31)
CREAT SERPL-MCNC: 0.85 MG/DL — SIGNIFICANT CHANGE UP (ref 0.5–1.3)
EOSINOPHIL # BLD AUTO: 0.01 K/UL — SIGNIFICANT CHANGE UP (ref 0–0.5)
EOSINOPHIL NFR BLD AUTO: 0.1 % — SIGNIFICANT CHANGE UP (ref 0–6)
GAS PNL BLDV: 131 MMOL/L — LOW (ref 136–146)
GLUCOSE BLDV-MCNC: 150 — HIGH (ref 70–99)
GLUCOSE SERPL-MCNC: 147 MG/DL — HIGH (ref 70–99)
HCO3 BLDV-SCNC: 27 MMOL/L — SIGNIFICANT CHANGE UP (ref 20–27)
HCT VFR BLD CALC: 43.2 % — SIGNIFICANT CHANGE UP (ref 34.5–45)
HCT VFR BLDV CALC: 44 % — SIGNIFICANT CHANGE UP (ref 34.5–45)
HGB BLD-MCNC: 14.1 G/DL — SIGNIFICANT CHANGE UP (ref 11.5–15.5)
HGB BLDV-MCNC: 14.4 G/DL — SIGNIFICANT CHANGE UP (ref 11.5–15.5)
IMM GRANULOCYTES # BLD AUTO: 0.12 # — SIGNIFICANT CHANGE UP
IMM GRANULOCYTES NFR BLD AUTO: 0.6 % — SIGNIFICANT CHANGE UP (ref 0–1.5)
LACTATE BLDV-MCNC: 1.2 MMOL/L — SIGNIFICANT CHANGE UP (ref 0.5–2)
LIDOCAIN IGE QN: 18.2 U/L — SIGNIFICANT CHANGE UP (ref 7–60)
LYMPHOCYTES # BLD AUTO: 1.56 K/UL — SIGNIFICANT CHANGE UP (ref 1–3.3)
LYMPHOCYTES # BLD AUTO: 8.3 % — LOW (ref 13–44)
MCHC RBC-ENTMCNC: 29.1 PG — SIGNIFICANT CHANGE UP (ref 27–34)
MCHC RBC-ENTMCNC: 32.6 % — SIGNIFICANT CHANGE UP (ref 32–36)
MCV RBC AUTO: 89.1 FL — SIGNIFICANT CHANGE UP (ref 80–100)
MONOCYTES # BLD AUTO: 0.77 K/UL — SIGNIFICANT CHANGE UP (ref 0–0.9)
MONOCYTES NFR BLD AUTO: 4.1 % — SIGNIFICANT CHANGE UP (ref 2–14)
NEUTROPHILS # BLD AUTO: 16.26 K/UL — HIGH (ref 1.8–7.4)
NEUTROPHILS NFR BLD AUTO: 86.5 % — HIGH (ref 43–77)
NRBC # FLD: 0 — SIGNIFICANT CHANGE UP
PCO2 BLDV: 39 MMHG — LOW (ref 41–51)
PH BLDV: 7.46 PH — HIGH (ref 7.32–7.43)
PLATELET # BLD AUTO: 308 K/UL — SIGNIFICANT CHANGE UP (ref 150–400)
PMV BLD: 10.4 FL — SIGNIFICANT CHANGE UP (ref 7–13)
PO2 BLDV: 55 MMHG — HIGH (ref 35–40)
POTASSIUM BLDV-SCNC: 4.1 MMOL/L — SIGNIFICANT CHANGE UP (ref 3.4–4.5)
POTASSIUM SERPL-MCNC: 5.2 MMOL/L — SIGNIFICANT CHANGE UP (ref 3.5–5.3)
POTASSIUM SERPL-SCNC: 5.2 MMOL/L — SIGNIFICANT CHANGE UP (ref 3.5–5.3)
PROT SERPL-MCNC: 7.7 G/DL — SIGNIFICANT CHANGE UP (ref 6–8.3)
RBC # BLD: 4.85 M/UL — SIGNIFICANT CHANGE UP (ref 3.8–5.2)
RBC # FLD: 15 % — HIGH (ref 10.3–14.5)
SAO2 % BLDV: 87.7 % — HIGH (ref 60–85)
SODIUM SERPL-SCNC: 137 MMOL/L — SIGNIFICANT CHANGE UP (ref 135–145)
WBC # BLD: 18.79 K/UL — HIGH (ref 3.8–10.5)
WBC # FLD AUTO: 18.79 K/UL — HIGH (ref 3.8–10.5)

## 2017-07-12 PROCEDURE — 74000: CPT | Mod: 26

## 2017-07-12 PROCEDURE — 74177 CT ABD & PELVIS W/CONTRAST: CPT | Mod: 26

## 2017-07-12 PROCEDURE — 71010: CPT | Mod: 26

## 2017-07-12 RX ORDER — AZTREONAM 2 G
1000 VIAL (EA) INJECTION ONCE
Qty: 0 | Refills: 0 | Status: COMPLETED | OUTPATIENT
Start: 2017-07-12 | End: 2017-07-12

## 2017-07-12 RX ORDER — SODIUM CHLORIDE 9 MG/ML
500 INJECTION INTRAMUSCULAR; INTRAVENOUS; SUBCUTANEOUS ONCE
Qty: 0 | Refills: 0 | Status: COMPLETED | OUTPATIENT
Start: 2017-07-12 | End: 2017-07-12

## 2017-07-12 RX ORDER — METOPROLOL TARTRATE 50 MG
5 TABLET ORAL ONCE
Qty: 0 | Refills: 0 | Status: COMPLETED | OUTPATIENT
Start: 2017-07-12 | End: 2017-07-12

## 2017-07-12 RX ORDER — ONDANSETRON 8 MG/1
4 TABLET, FILM COATED ORAL ONCE
Qty: 0 | Refills: 0 | Status: COMPLETED | OUTPATIENT
Start: 2017-07-12 | End: 2017-07-12

## 2017-07-12 RX ORDER — VANCOMYCIN HCL 1 G
1000 VIAL (EA) INTRAVENOUS ONCE
Qty: 0 | Refills: 0 | Status: COMPLETED | OUTPATIENT
Start: 2017-07-12 | End: 2017-07-12

## 2017-07-12 RX ADMIN — ONDANSETRON 4 MILLIGRAM(S): 8 TABLET, FILM COATED ORAL at 20:31

## 2017-07-12 RX ADMIN — Medication 5 MILLIGRAM(S): at 22:10

## 2017-07-12 RX ADMIN — SODIUM CHLORIDE 250 MILLILITER(S): 9 INJECTION INTRAMUSCULAR; INTRAVENOUS; SUBCUTANEOUS at 20:33

## 2017-07-12 RX ADMIN — Medication 50 MILLIGRAM(S): at 20:39

## 2017-07-12 RX ADMIN — Medication 250 MILLIGRAM(S): at 21:42

## 2017-07-12 NOTE — ED ADULT NURSE NOTE - OBJECTIVE STATEMENT
Pt rec'd in 15, accompanied by family. Pt A&Ox2-3, history of SBO, c/o abd pain and vomiting since this evening, LBM this morning, normal consistency. Per family, pt also had a fever earlier today, which has now resolved without meds.

## 2017-07-12 NOTE — ED PROVIDER NOTE - MEDICAL DECISION MAKING DETAILS
88yo female with abd pain, nausea/vomiting concerning for SBO. Pt also hypoxic in ED, possible concern for aspiration pna, labs, CT, admit

## 2017-07-12 NOTE — ED PROVIDER NOTE - CRITICAL CARE PROVIDED
interpretation of diagnostic studies/direct patient care (not related to procedure)/documentation/consultation with other physicians/additional history taking/consult w/ pt's family directly relating to pts condition

## 2017-07-12 NOTE — ED ADULT NURSE REASSESSMENT NOTE - NS ED NURSE REASSESS COMMENT FT1
rec'd pt. awake, alert, oriented to person and place, on 2L O2 via nc. denies any pain at this time. pt. felt nauseated. hypertensive, MD made aware. meds given as ordered. son at bedside. will continue to monitor

## 2017-07-12 NOTE — ED PROVIDER NOTE - OBJECTIVE STATEMENT
89F with hx CAD, CHF, HTN h/o of multiple admission for SBO, volvulus, SBR, p/w abdominal pain, nausea, vomiting, fever today. Last BM this morning, normal. Pt also with cough today. 89F with hx CAD, CHF, HTN h/o of multiple admission for SBO, volvulus, SBR, p/w abdominal pain, nausea, vomiting, fever today. Last BM this morning, normal. Pt also with cough today.    Attendinyo female presents with vomiting and fever.  Has abdominal distention and decreased appetite.  Was hypoxic for EMS. 89F with hx CAD, CHF, HTN h/o of multiple admission for SBO, volvulus, SBR, p/w abdominal pain, nausea, vomiting, fever today. Last BM this morning, normal. Pt also with cough today.  meds: potassium, aciphex, asa, evista, lasix 40, ramipril, toprol, zocor, budenoside,       Attendinyo female presents with vomiting and fever.  Has abdominal distention and decreased appetite.  Was hypoxic for EMS.

## 2017-07-13 DIAGNOSIS — K56.69 OTHER INTESTINAL OBSTRUCTION: ICD-10-CM

## 2017-07-13 LAB
BUN SERPL-MCNC: 20 MG/DL — SIGNIFICANT CHANGE UP (ref 7–23)
BUN SERPL-MCNC: 22 MG/DL — SIGNIFICANT CHANGE UP (ref 7–23)
CA-I BLD-SCNC: 1.01 MMOL/L — LOW (ref 1.03–1.23)
CALCIUM SERPL-MCNC: 9 MG/DL — SIGNIFICANT CHANGE UP (ref 8.4–10.5)
CALCIUM SERPL-MCNC: 9.1 MG/DL — SIGNIFICANT CHANGE UP (ref 8.4–10.5)
CHLORIDE SERPL-SCNC: 97 MMOL/L — LOW (ref 98–107)
CHLORIDE SERPL-SCNC: 99 MMOL/L — SIGNIFICANT CHANGE UP (ref 98–107)
CO2 SERPL-SCNC: 21 MMOL/L — LOW (ref 22–31)
CO2 SERPL-SCNC: 24 MMOL/L — SIGNIFICANT CHANGE UP (ref 22–31)
CREAT SERPL-MCNC: 0.77 MG/DL — SIGNIFICANT CHANGE UP (ref 0.5–1.3)
CREAT SERPL-MCNC: 0.84 MG/DL — SIGNIFICANT CHANGE UP (ref 0.5–1.3)
GLUCOSE SERPL-MCNC: 129 MG/DL — HIGH (ref 70–99)
GLUCOSE SERPL-MCNC: 99 MG/DL — SIGNIFICANT CHANGE UP (ref 70–99)
HCT VFR BLD CALC: 39.1 % — SIGNIFICANT CHANGE UP (ref 34.5–45)
HCT VFR BLD CALC: 39.1 % — SIGNIFICANT CHANGE UP (ref 34.5–45)
HGB BLD-MCNC: 12.7 G/DL — SIGNIFICANT CHANGE UP (ref 11.5–15.5)
HGB BLD-MCNC: 12.8 G/DL — SIGNIFICANT CHANGE UP (ref 11.5–15.5)
LACTATE SERPL-SCNC: 1.9 MMOL/L — SIGNIFICANT CHANGE UP (ref 0.5–2)
MAGNESIUM SERPL-MCNC: 2 MG/DL — SIGNIFICANT CHANGE UP (ref 1.6–2.6)
MAGNESIUM SERPL-MCNC: 2.1 MG/DL — SIGNIFICANT CHANGE UP (ref 1.6–2.6)
MCHC RBC-ENTMCNC: 28.9 PG — SIGNIFICANT CHANGE UP (ref 27–34)
MCHC RBC-ENTMCNC: 29.2 PG — SIGNIFICANT CHANGE UP (ref 27–34)
MCHC RBC-ENTMCNC: 32.5 % — SIGNIFICANT CHANGE UP (ref 32–36)
MCHC RBC-ENTMCNC: 32.7 % — SIGNIFICANT CHANGE UP (ref 32–36)
MCV RBC AUTO: 89.1 FL — SIGNIFICANT CHANGE UP (ref 80–100)
MCV RBC AUTO: 89.3 FL — SIGNIFICANT CHANGE UP (ref 80–100)
NRBC # FLD: 0 — SIGNIFICANT CHANGE UP
NRBC # FLD: 0 — SIGNIFICANT CHANGE UP
PHOSPHATE SERPL-MCNC: 3.5 MG/DL — SIGNIFICANT CHANGE UP (ref 2.5–4.5)
PHOSPHATE SERPL-MCNC: 3.7 MG/DL — SIGNIFICANT CHANGE UP (ref 2.5–4.5)
PLATELET # BLD AUTO: 216 K/UL — SIGNIFICANT CHANGE UP (ref 150–400)
PLATELET # BLD AUTO: 286 K/UL — SIGNIFICANT CHANGE UP (ref 150–400)
PMV BLD: 10.7 FL — SIGNIFICANT CHANGE UP (ref 7–13)
PMV BLD: 11.3 FL — SIGNIFICANT CHANGE UP (ref 7–13)
POTASSIUM SERPL-MCNC: 4.1 MMOL/L — SIGNIFICANT CHANGE UP (ref 3.5–5.3)
POTASSIUM SERPL-MCNC: 4.6 MMOL/L — SIGNIFICANT CHANGE UP (ref 3.5–5.3)
POTASSIUM SERPL-SCNC: 4.1 MMOL/L — SIGNIFICANT CHANGE UP (ref 3.5–5.3)
POTASSIUM SERPL-SCNC: 4.6 MMOL/L — SIGNIFICANT CHANGE UP (ref 3.5–5.3)
RBC # BLD: 4.38 M/UL — SIGNIFICANT CHANGE UP (ref 3.8–5.2)
RBC # BLD: 4.39 M/UL — SIGNIFICANT CHANGE UP (ref 3.8–5.2)
RBC # FLD: 15.1 % — HIGH (ref 10.3–14.5)
RBC # FLD: 15.1 % — HIGH (ref 10.3–14.5)
SODIUM SERPL-SCNC: 136 MMOL/L — SIGNIFICANT CHANGE UP (ref 135–145)
SODIUM SERPL-SCNC: 137 MMOL/L — SIGNIFICANT CHANGE UP (ref 135–145)
SPECIMEN SOURCE: SIGNIFICANT CHANGE UP
SPECIMEN SOURCE: SIGNIFICANT CHANGE UP
WBC # BLD: 15.2 K/UL — HIGH (ref 3.8–10.5)
WBC # BLD: 16.97 K/UL — HIGH (ref 3.8–10.5)
WBC # FLD AUTO: 15.2 K/UL — HIGH (ref 3.8–10.5)
WBC # FLD AUTO: 16.97 K/UL — HIGH (ref 3.8–10.5)

## 2017-07-13 PROCEDURE — 71010: CPT | Mod: 26,77

## 2017-07-13 PROCEDURE — 71010: CPT | Mod: 26

## 2017-07-13 RX ORDER — ENOXAPARIN SODIUM 100 MG/ML
40 INJECTION SUBCUTANEOUS DAILY
Qty: 0 | Refills: 0 | Status: DISCONTINUED | OUTPATIENT
Start: 2017-07-13 | End: 2017-07-21

## 2017-07-13 RX ORDER — VANCOMYCIN HCL 1 G
750 VIAL (EA) INTRAVENOUS EVERY 24 HOURS
Qty: 0 | Refills: 0 | Status: DISCONTINUED | OUTPATIENT
Start: 2017-07-13 | End: 2017-07-15

## 2017-07-13 RX ORDER — FUROSEMIDE 40 MG
40 TABLET ORAL ONCE
Qty: 0 | Refills: 0 | Status: COMPLETED | OUTPATIENT
Start: 2017-07-13 | End: 2017-07-13

## 2017-07-13 RX ORDER — ENOXAPARIN SODIUM 100 MG/ML
40 INJECTION SUBCUTANEOUS ONCE
Qty: 0 | Refills: 0 | Status: COMPLETED | OUTPATIENT
Start: 2017-07-13 | End: 2017-07-13

## 2017-07-13 RX ORDER — LIDOCAINE 4 G/100G
5 CREAM TOPICAL ONCE
Qty: 0 | Refills: 0 | Status: COMPLETED | OUTPATIENT
Start: 2017-07-13 | End: 2017-07-13

## 2017-07-13 RX ORDER — ENOXAPARIN SODIUM 100 MG/ML
40 INJECTION SUBCUTANEOUS
Qty: 0 | Refills: 0 | Status: DISCONTINUED | OUTPATIENT
Start: 2017-07-13 | End: 2017-07-13

## 2017-07-13 RX ORDER — TETRACAINE/BENZOCAINE/BUTAMBEN 2%-14%-2%
1 OINTMENT (GRAM) TOPICAL ONCE
Qty: 0 | Refills: 0 | Status: COMPLETED | OUTPATIENT
Start: 2017-07-13 | End: 2017-07-13

## 2017-07-13 RX ORDER — AZTREONAM 2 G
2000 VIAL (EA) INJECTION EVERY 8 HOURS
Qty: 0 | Refills: 0 | Status: COMPLETED | OUTPATIENT
Start: 2017-07-13 | End: 2017-07-13

## 2017-07-13 RX ORDER — SODIUM CHLORIDE 9 MG/ML
1000 INJECTION, SOLUTION INTRAVENOUS
Qty: 0 | Refills: 0 | Status: DISCONTINUED | OUTPATIENT
Start: 2017-07-13 | End: 2017-07-13

## 2017-07-13 RX ORDER — VANCOMYCIN HCL 1 G
1000 VIAL (EA) INTRAVENOUS EVERY 24 HOURS
Qty: 0 | Refills: 0 | Status: DISCONTINUED | OUTPATIENT
Start: 2017-07-13 | End: 2017-07-13

## 2017-07-13 RX ORDER — ALBUTEROL 90 UG/1
2.5 AEROSOL, METERED ORAL ONCE
Qty: 0 | Refills: 0 | Status: COMPLETED | OUTPATIENT
Start: 2017-07-13 | End: 2017-07-13

## 2017-07-13 RX ORDER — METOPROLOL TARTRATE 50 MG
5 TABLET ORAL EVERY 6 HOURS
Qty: 0 | Refills: 0 | Status: DISCONTINUED | OUTPATIENT
Start: 2017-07-13 | End: 2017-07-14

## 2017-07-13 RX ADMIN — Medication 5 MILLIGRAM(S): at 12:07

## 2017-07-13 RX ADMIN — ENOXAPARIN SODIUM 40 MILLIGRAM(S): 100 INJECTION SUBCUTANEOUS at 22:18

## 2017-07-13 RX ADMIN — Medication 150 MILLIGRAM(S): at 22:18

## 2017-07-13 RX ADMIN — Medication 5 MILLIGRAM(S): at 19:27

## 2017-07-13 RX ADMIN — ENOXAPARIN SODIUM 40 MILLIGRAM(S): 100 INJECTION SUBCUTANEOUS at 03:42

## 2017-07-13 RX ADMIN — Medication 40 MILLIGRAM(S): at 23:00

## 2017-07-13 RX ADMIN — Medication 50 MILLIGRAM(S): at 04:47

## 2017-07-13 RX ADMIN — SODIUM CHLORIDE 75 MILLILITER(S): 9 INJECTION, SOLUTION INTRAVENOUS at 03:42

## 2017-07-13 RX ADMIN — LIDOCAINE 5 MILLILITER(S): 4 CREAM TOPICAL at 01:23

## 2017-07-13 RX ADMIN — ALBUTEROL 2.5 MILLIGRAM(S): 90 AEROSOL, METERED ORAL at 00:14

## 2017-07-13 RX ADMIN — Medication 1 SPRAY(S): at 01:23

## 2017-07-13 RX ADMIN — SODIUM CHLORIDE 150 MILLILITER(S): 9 INJECTION, SOLUTION INTRAVENOUS at 11:05

## 2017-07-13 NOTE — H&P ADULT - NSHPLABSRESULTS_GEN_ALL_CORE
CBC (07-12 @ 19:30)                          14.1                     18.79<H>  )--------------(  308        86.5<H>% Neuts, 8.3<L>% Lymphs, ANC: 16.26<H>                          43.2      BMP (07-12 @ 19:31)       137     |  96<L>   |  22    			Ca++ --      Ca 9.4          ---------------------------------( 147<H>		Mg --           5.2     |  22      |  0.85  			Ph --        LFTs (07-12 @ 19:31)      TPro 7.7 / Alb 3.9 / TBili 0.6 / DBili -- / AST 57<H> / ALT 25 / AlkPhos 91          VBG (07-12 @ 19:30)     7.46<H> / 39<L> / 55<H> / 27 / 3.1 / 87.7<H>%      Lactate: 1.2        CT abdomen: 1.  High-grade small bowel obstruction with a single transition point in   the right lower quadrant at the site of small bowel anastomosis.  No   evidence of gastrointestinal perforation, abscess/drainable fluid   collection or secondary CT signs of bowel ischemia..  2.  Patchy consolidation dependently in both lower lobes and in the   lingula with areas of poor enhancement compatible with multifocal   pneumonia.  Aspiration should be excluded.  Small pleural effusions   bilaterally.

## 2017-07-13 NOTE — CHART NOTE - NSCHARTNOTEFT_GEN_A_CORE
Patient is responsive but oriented only to person. Abdomen soft, nontender, still slightly distended but seems improving. Chung still in with clear urine in the bag. Minimal output from NGT, flushed once and it was working well. Patient is responsive but oriented only to person. Patient was coughing. Abdomen soft, nontender, still slightly distended but seems improving. Chung still in with clear urine in the bag. Minimal output from NGT, flushed once and it was working well.  Re-examed patient with Dr. Andrew Landeros, patient continued having productive cough, decided to order chest x-ray to rule out pneumonia from aspiration, or possible fluid overload given her low urine output

## 2017-07-13 NOTE — PROVIDER CONTACT NOTE (OTHER) - SITUATION
patient admitted forvomitting and weakness.  On IVF at 150 cc/hour  Patient has fontana catheter- urine output 150 cc between 1300 and 1800

## 2017-07-13 NOTE — H&P ADULT - ASSESSMENT
89 female with small bowel obstruction and concern for pulmonary process, aspiration pneumonitis vs pneumonia

## 2017-07-13 NOTE — H&P ADULT - PROBLEM SELECTOR PLAN 1
- Admit to surgery  - NGT/NPO  - IVF  - Empiric coverage for possible pneumonia in a high-risk patient  - VTE ppx  - Hold home antihypertensives

## 2017-07-13 NOTE — PROGRESS NOTE ADULT - SUBJECTIVE AND OBJECTIVE BOX
89 F with cardiac comorbidities and history of small bowel volvulus s/p SBR in 2005 and multiple episodes of SBO s/p ex lap and SBR in 2012 admitted for SBO after presenting to ER with one day of abdominal pain with nausea, bilious emesis and no bowel movements/flatus since start of symptoms.     Vital Signs Last 24 Hrs  T(C): 36.4 (13 Jul 2017 03:43), Max: 37.8 (12 Jul 2017 19:41)  T(F): 97.5 (13 Jul 2017 03:43), Max: 100.1 (12 Jul 2017 19:41)  HR: 98 (13 Jul 2017 03:43) (91 - 101)  BP: 123/80 (13 Jul 2017 03:43) (123/80 - 196/158)  BP(mean): --  RR: 15 (13 Jul 2017 03:43) (15 - 20)  SpO2: 96% (13 Jul 2017 03:43) (92% - 98%)    SUBJECTIVE: Pt seen in the ER, somnolent but responsive.      PHYSICAL EXAM:  Constitutional: resting in bed with no acute distress, somnolent but responsive  Respiratory:  Lungs CTA, B/L, no rales , no wheezing, no rhonchi.  Cardiovascular:  S1, S2, RRR  Gastrointestinal: Abdomen soft, slightly distended but nontender  Genitourinary:  Chung with clear urine  Extremities:  No edema, no calf tenderrness,  Neurological: AxAxOx1    I&O's Summary    12 Jul 2017 07:01  -  13 Jul 2017 07:00  --------------------------------------------------------  IN: 500 mL / OUT: 700 mL / NET: -200 mL      I&O's Detail    12 Jul 2017 07:01  -  13 Jul 2017 07:00  --------------------------------------------------------  IN:    Sodium Chloride 0.9% IV Bolus: 500 mL  Total IN: 500 mL    OUT:    Indwelling Catheter - Urethral: 300 mL    Nasoenteral Tube: 400 mL  Total OUT: 700 mL    Total NET: -200 mL    MEDICATIONS  (STANDING):  lactated ringers. 1000 milliLiter(s) (75 mL/Hr) IV Continuous <Continuous>  vancomycin  IVPB 1000 milliGRAM(s) IV Intermittent every 24 hours  enoxaparin Injectable 40 milliGRAM(s) SubCutaneous daily    MEDICATIONS  (PRN):      LABS:                        12.8   16.97 )-----------( 216      ( 13 Jul 2017 05:40 )             39.1     07-13    137  |  97<L>  |  20  ----------------------------<  129<H>  4.1   |  21<L>  |  0.84    Ca    9.1      13 Jul 2017 05:40  Phos  3.7     07-13  Mg     2.1     07-13    TPro  7.7  /  Alb  3.9  /  TBili  0.6  /  DBili  x   /  AST  57<H>  /  ALT  25  /  AlkPhos  91  07-12      RADIOLOGY & ADDITIONAL STUDIES:   CT abd and pelvis with contrast (07-12): High-grade small bowel obstruction with a single transition point in the right lower quadrant at the site of small bowel anastomosis.  No evidence of gastrointestinal perforation, abscess/drainable fluid collection or secondary CT signs of bowel ischemia.

## 2017-07-13 NOTE — H&P ADULT - NSHPREVIEWOFSYSTEMS_GEN_ALL_CORE
Confused  Cough since earlier today  No chest pain  some abdominal pain, earlier, not now  No pain with urination  No bleeding, calf tenderness, or shortness of breath

## 2017-07-13 NOTE — PROGRESS NOTE ADULT - SUBJECTIVE AND OBJECTIVE BOX
INTERVAL HPI/OVERNIGHT EVENTS: Pt seen and examined in the ED. Resting comfortably. States her abdomen hurts.       MEDICATIONS  (STANDING):  lactated ringers. 1000 milliLiter(s) (150 mL/Hr) IV Continuous <Continuous>  enoxaparin Injectable 40 milliGRAM(s) SubCutaneous daily  vancomycin  IVPB 750 milliGRAM(s) IV Intermittent every 24 hours    MEDICATIONS  (PRN):      Vital Signs Last 24 Hrs  T(C): 36.2 (13 Jul 2017 08:30), Max: 37.8 (12 Jul 2017 19:41)  T(F): 97.2 (13 Jul 2017 08:30), Max: 100.1 (12 Jul 2017 19:41)  HR: 93 (13 Jul 2017 08:30) (91 - 101)  BP: 176/87 (13 Jul 2017 08:30) (123/80 - 196/158)  BP(mean): --  RR: 18 (13 Jul 2017 08:30) (15 - 20)  SpO2: 96% (13 Jul 2017 08:30) (92% - 98%)    PHYSICAL EXAM:      Constitutional: Sleepy, NAD    Gastrointestinal: Abd soft, NT, distended. Tympanic        I&O's Detail    12 Jul 2017 07:01  -  13 Jul 2017 07:00  --------------------------------------------------------  IN:    Sodium Chloride 0.9% IV Bolus: 500 mL  Total IN: 500 mL    OUT:    Indwelling Catheter - Urethral: 300 mL    Nasoenteral Tube: 400 mL  Total OUT: 700 mL    Total NET: -200 mL          LABS:                        12.8   16.97 )-----------( 216      ( 13 Jul 2017 05:40 )             39.1     07-13    137  |  97<L>  |  20  ----------------------------<  129<H>  4.1   |  21<L>  |  0.84    Ca    9.1      13 Jul 2017 05:40  Phos  3.7     07-13  Mg     2.1     07-13    TPro  7.7  /  Alb  3.9  /  TBili  0.6  /  DBili  x   /  AST  57<H>  /  ALT  25  /  AlkPhos  91  07-12          RADIOLOGY & ADDITIONAL STUDIES:

## 2017-07-13 NOTE — ED ADULT NURSE REASSESSMENT NOTE - NS ED NURSE REASSESS COMMENT FT1
rec'd pt. awake, alert, denies any pain at this time. still hypertensive, MD made aware. pt. with NGT via rt nare attached to regular suction noted with 400ml dark greenish output. MD aware. pt. appears in NAD at this time, denies any SOB. sone at bedside. will continue to monitor

## 2017-07-13 NOTE — H&P ADULT - NSHPPHYSICALEXAM_GEN_ALL_CORE
Vital Signs Last 24 Hrs  T(C): 36.4 (13 Jul 2017 03:43), Max: 37.8 (12 Jul 2017 19:41)  T(F): 97.5 (13 Jul 2017 03:43), Max: 100.1 (12 Jul 2017 19:41)  HR: 98 (13 Jul 2017 03:43) (91 - 101)  BP: 123/80 (13 Jul 2017 03:43) (123/80 - 196/158)  BP(mean): --  RR: 15 (13 Jul 2017 03:43) (15 - 20)  SpO2: 96% (13 Jul 2017 03:43) (92% - 98%)    NAD, somnolent but able to answer questions about pain. Not oriented to place or time.  Non-labored respirations. Decreased breath sounds at bases, course crackles at left base  Heart rate regular, rhythm regular  Abdomen distended, soft, exam limited by patient's mental status but appears to be non-tender. No masses appreciated.  Extremities grossly normal except severe rheumatoid disease, contracted.

## 2017-07-13 NOTE — PROGRESS NOTE ADULT - ASSESSMENT
89 F with cardiac comorbidities and history of small bowel volvulus s/p SBR in 2005 and multiple episodes of SBO s/p ex lap and SBR in 2012 admitted for SBO    Plan:  ·	NPO with NGT  ·	Serial abdominal exam  ·	Patient has a Chung in, will monitor UOP  ·	Check labs including lactate q4h  ·	Monitor VS, NGT output and UOP q6h 89 F with cardiac comorbidities and history of small bowel volvulus s/p SBR in 2005 and multiple episodes of SBO s/p ex lap and SBR in 2012 admitted for SBO    Plan:  ·	NPO with NGT  ·	Serial abdominal exam  ·	Patient has a Chung in, will monitor UOP  ·	Check labs including lactate q4h  ·	Monitor VS, NGT output and UOP q6h  ·	d/w pt condition and treatment plan with son at bedside in detail

## 2017-07-13 NOTE — H&P ADULT - HISTORY OF PRESENT ILLNESS
88 yo female with cardiac comorbidities and history of small bowel volvulus s/p SBR in 2005 and multiple episodes of SBO s/p ex lap and SBR in 2012 presenting now with one day of abdominal pain with nausea, bilious emesis and no bowel movements/flatus since start of symptoms.   Per son, who is bedside, patient is pleasantly demented at baseline, ambulates with walker, however over past two days has seemed more confused.   He states patient was otherwise in normal state of health until the symptoms started. Denies fever/chills, cough, diarrhea.    Patient's son states that patient may have an advanced directive and that he will attempt to acquire the document today during the day.

## 2017-07-13 NOTE — CHART NOTE - NSCHARTNOTEFT_GEN_A_CORE
Patient with PMH of CHF now has  and rales .  In the early phase of pulmonary edema.  Will Hold fluids, will receive Lasix 40 mg/ IV stat , semisitting position and Check Vitals   [id number]

## 2017-07-13 NOTE — CONSULT NOTE ADULT - SUBJECTIVE AND OBJECTIVE BOX
Patient was seen and examined. Comprehensive dictation  # [id number] was performed. The patient was in good health with chronic mitral regurgitation, asthma and bilateral DJD of the hips after bilateral ORIF for fractures. She presently has an acute small bowel  obstruction  with focal point  of stenosis at a prior anastomotic site..  As a result of the obstruction, she has vomited and  has aspiration pneumonia with  acute metabolic encephalopathy.  She was initially hydrated  and now received Lasix  with good urine output..   She will continue to have medical  followup examinations, in hospital  by my group. to lessen the risk of further medical complications. .Zosyn would be preferable to vancomycin  for antibiotic coverage of aspiration pneumonitis.

## 2017-07-14 DIAGNOSIS — J18.9 PNEUMONIA, UNSPECIFIED ORGANISM: ICD-10-CM

## 2017-07-14 LAB
ALBUMIN SERPL ELPH-MCNC: 3.4 G/DL — SIGNIFICANT CHANGE UP (ref 3.3–5)
ALP SERPL-CCNC: 80 U/L — SIGNIFICANT CHANGE UP (ref 40–120)
ALT FLD-CCNC: 19 U/L — SIGNIFICANT CHANGE UP (ref 4–33)
AST SERPL-CCNC: 40 U/L — HIGH (ref 4–32)
BILIRUB SERPL-MCNC: 0.5 MG/DL — SIGNIFICANT CHANGE UP (ref 0.2–1.2)
BUN SERPL-MCNC: 21 MG/DL — SIGNIFICANT CHANGE UP (ref 7–23)
CALCIUM SERPL-MCNC: 8.6 MG/DL — SIGNIFICANT CHANGE UP (ref 8.4–10.5)
CHLORIDE SERPL-SCNC: 98 MMOL/L — SIGNIFICANT CHANGE UP (ref 98–107)
CO2 SERPL-SCNC: 24 MMOL/L — SIGNIFICANT CHANGE UP (ref 22–31)
CREAT SERPL-MCNC: 0.79 MG/DL — SIGNIFICANT CHANGE UP (ref 0.5–1.3)
GLUCOSE SERPL-MCNC: 79 MG/DL — SIGNIFICANT CHANGE UP (ref 70–99)
HCT VFR BLD CALC: 38.2 % — SIGNIFICANT CHANGE UP (ref 34.5–45)
HGB BLD-MCNC: 12.7 G/DL — SIGNIFICANT CHANGE UP (ref 11.5–15.5)
LACTATE SERPL-SCNC: 1.1 MMOL/L — SIGNIFICANT CHANGE UP (ref 0.5–2)
MAGNESIUM SERPL-MCNC: 2.1 MG/DL — SIGNIFICANT CHANGE UP (ref 1.6–2.6)
MCHC RBC-ENTMCNC: 30.2 PG — SIGNIFICANT CHANGE UP (ref 27–34)
MCHC RBC-ENTMCNC: 33.2 % — SIGNIFICANT CHANGE UP (ref 32–36)
MCV RBC AUTO: 91 FL — SIGNIFICANT CHANGE UP (ref 80–100)
NRBC # FLD: 0 — SIGNIFICANT CHANGE UP
PHOSPHATE SERPL-MCNC: 3.3 MG/DL — SIGNIFICANT CHANGE UP (ref 2.5–4.5)
PLATELET # BLD AUTO: 283 K/UL — SIGNIFICANT CHANGE UP (ref 150–400)
PMV BLD: 10.6 FL — SIGNIFICANT CHANGE UP (ref 7–13)
POTASSIUM SERPL-MCNC: 4.6 MMOL/L — SIGNIFICANT CHANGE UP (ref 3.5–5.3)
POTASSIUM SERPL-SCNC: 4.6 MMOL/L — SIGNIFICANT CHANGE UP (ref 3.5–5.3)
PROT SERPL-MCNC: 6.7 G/DL — SIGNIFICANT CHANGE UP (ref 6–8.3)
RBC # BLD: 4.2 M/UL — SIGNIFICANT CHANGE UP (ref 3.8–5.2)
RBC # FLD: 15.1 % — HIGH (ref 10.3–14.5)
SODIUM SERPL-SCNC: 138 MMOL/L — SIGNIFICANT CHANGE UP (ref 135–145)
WBC # BLD: 13.15 K/UL — HIGH (ref 3.8–10.5)
WBC # FLD AUTO: 13.15 K/UL — HIGH (ref 3.8–10.5)

## 2017-07-14 PROCEDURE — 74000: CPT | Mod: 26

## 2017-07-14 PROCEDURE — 99232 SBSQ HOSP IP/OBS MODERATE 35: CPT | Mod: GC

## 2017-07-14 PROCEDURE — 71010: CPT | Mod: 26

## 2017-07-14 RX ORDER — SIMVASTATIN 20 MG/1
20 TABLET, FILM COATED ORAL AT BEDTIME
Qty: 0 | Refills: 0 | Status: DISCONTINUED | OUTPATIENT
Start: 2017-07-14 | End: 2017-07-21

## 2017-07-14 RX ORDER — SODIUM CHLORIDE 9 MG/ML
1000 INJECTION, SOLUTION INTRAVENOUS
Qty: 0 | Refills: 0 | Status: DISCONTINUED | OUTPATIENT
Start: 2017-07-14 | End: 2017-07-16

## 2017-07-14 RX ORDER — LISINOPRIL 2.5 MG/1
10 TABLET ORAL DAILY
Qty: 0 | Refills: 0 | Status: DISCONTINUED | OUTPATIENT
Start: 2017-07-14 | End: 2017-07-14

## 2017-07-14 RX ORDER — IPRATROPIUM/ALBUTEROL SULFATE 18-103MCG
3 AEROSOL WITH ADAPTER (GRAM) INHALATION EVERY 6 HOURS
Qty: 0 | Refills: 0 | Status: DISCONTINUED | OUTPATIENT
Start: 2017-07-14 | End: 2017-07-21

## 2017-07-14 RX ORDER — METOPROLOL TARTRATE 50 MG
12.5 TABLET ORAL DAILY
Qty: 0 | Refills: 0 | Status: DISCONTINUED | OUTPATIENT
Start: 2017-07-14 | End: 2017-07-21

## 2017-07-14 RX ORDER — LISINOPRIL 2.5 MG/1
10 TABLET ORAL DAILY
Qty: 0 | Refills: 0 | Status: DISCONTINUED | OUTPATIENT
Start: 2017-07-14 | End: 2017-07-20

## 2017-07-14 RX ORDER — SODIUM CHLORIDE 9 MG/ML
500 INJECTION, SOLUTION INTRAVENOUS ONCE
Qty: 0 | Refills: 0 | Status: COMPLETED | OUTPATIENT
Start: 2017-07-14 | End: 2017-07-14

## 2017-07-14 RX ADMIN — SODIUM CHLORIDE 500 MILLILITER(S): 9 INJECTION, SOLUTION INTRAVENOUS at 10:03

## 2017-07-14 RX ADMIN — Medication 5 MILLIGRAM(S): at 06:32

## 2017-07-14 RX ADMIN — Medication 5 MILLIGRAM(S): at 12:07

## 2017-07-14 RX ADMIN — SODIUM CHLORIDE 100 MILLILITER(S): 9 INJECTION, SOLUTION INTRAVENOUS at 12:07

## 2017-07-14 RX ADMIN — Medication 3 MILLILITER(S): at 17:10

## 2017-07-14 NOTE — CONSULT NOTE ADULT - SUBJECTIVE AND OBJECTIVE BOX
House Cardiology Initial Consult  Consult Spectra [phone]    CHIEF COMPLAINT:  cough    HISTORY OF PRESENT ILLNESS:  89F HTN, HLD, GERD, reported CAD, diastolic CHF, MR s/p Mitraclip, cognitive impairment, hx of small bowel volvulus/multiple SBO's who presented with another episode of SBO. Pt noted to have a NC requirement in addition to pleural effusions.    Pt reports cough, mildly productive, which was worse the day prior to evaluation.    Allergies  penicillamine (Hives)  penicillin (Hives)  penicillins (Hives)	    MEDICATIONS:  enoxaparin Injectable 40 milliGRAM(s) SubCutaneous daily  metoprolol Injectable 5 milliGRAM(s) IV Push every 6 hours  vancomycin  IVPB 750 milliGRAM(s) IV Intermittent every 24 hours  ALBUTerol/ipratropium for Nebulization 3 milliLiter(s) Nebulizer every 6 hours PRN  dextrose 5% + sodium chloride 0.45%. 1000 milliLiter(s) IV Continuous    PAST MEDICAL & SURGICAL HISTORY:  Carotid stenosis  GERD (gastroesophageal reflux disease)  HLD (hyperlipidemia)  Biventricular heart failure  RBBB  CAD (coronary artery disease)  HTN (hypertension)  Mitral valve disorder  CAD (coronary artery disease)  Small bowel obstruction: H/O  chf  Chronic Glaucoma  Hiatal Hernia  Osteopenia  Hyperlipidemia, Acquired  Benign Essential Hypertension  SBO (small bowel obstruction): initial SBO approx 10 yrs ago s/p partial resection small intestine, then lysis of adhesions approx 2012  S/P exploratory laparotomy: Lysis of Adhesion  S/P Endoscopy  S/P Colonoscopy  S/P Small Bowel Resection    FAMILY HISTORY:  No pertinent family history in first degree relatives    SOCIAL HISTORY:    [x] Non-smoker  [ ] Smoker  [ ] Alcohol    Review of Systems:  Constitutional: [- ] Fever [ -] Chills [ ] Fatigue [ ] Weight change   HEENT: [ ] Blurred vision [ ] Eye Pain [- ] Headache [ ] Runny nose [ ] Sore Throat   Respiratory: [x ] Cough [x ] Wheezing [x ] Shortness of breath  Cardiovascular: [- ] Chest Pain [- ] Palpitations [ ] ROPER [ ] PND [ ] Orthopnea  Gastrointestinal: [- ] Abdominal Pain [- ] Diarrhea [ ] Constipation [ ] Hemorrhoids [x ] Nausea [x ] Vomiting  Genitourinary: [ ] Nocturia [ ] Dysuria [ ] Incontinence [x] fontana  Extremities: [- ] Swelling [ ] Joint Pain  Neurologic: [ ] Focal deficit [ ] Paresthesias [-] Syncope  Skin: [- ] Rash [ ] Ecchymoses [ ] Wounds [ ] Lesions  Psychiatry: [- ] Depression [ ] Suicidal/Homicidal Ideation [ ] Anxiety [ ] Sleep Disturbances  [x ] 10 point review of systems is otherwise negative except as mentioned above            [ ]Unable to obtain    PHYSICAL EXAM:  T(C): 36.8 (07-14-17 @ 15:17), Max: 36.9 (07-14-17 @ 10:13)  HR: 91 (07-14-17 @ 15:17) (91 - 98)  BP: 146/87 (07-14-17 @ 15:17) (123/67 - 149/93)  RR: 18 (07-14-17 @ 15:17) (16 - 18)  SpO2: 96% (07-14-17 @ 15:17) (92% - 96%)  Wt(kg): --  I&O's Summary    13 Jul 2017 07:01  -  14 Jul 2017 07:00  --------------------------------------------------------  IN: 1000 mL / OUT: 1900 mL / NET: -900 mL    14 Jul 2017 07:01  -  14 Jul 2017 17:29  --------------------------------------------------------  IN: 500 mL / OUT: 250 mL / NET: 250 mL    Appearance: No acute distress  HEENT:  mmm. NGT in place  Cardiovascular: s1s2 rrr no LE edema  Respiratory: mild crackles diffusely with inspiratory wheezes. moderately reduced air movement  Psychiatry: pt awake, alert, but mildly confused  Gastrointestinal:  soft nt	  Skin: scattered ecchymoses  Neurologic: grossly non-focal  Vascular: Peripheral pulses palpable 2+ bilaterally      LABS:	 	    CBC Full  -  ( 14 Jul 2017 10:49 )  WBC Count : 13.15 K/uL  Hemoglobin : 12.7 g/dL  Hematocrit : 38.2 %  Platelet Count - Automated : 283 K/uL    07-14    138  |  98  |  21  ----------------------------<  79  4.6   |  24  |  0.79  07-13    136  |  99  |  22  ----------------------------<  99  4.6   |  24  |  0.77    Ca    8.6      14 Jul 2017 05:38  Phos  3.3     07-14  Mg     2.1     07-14    TPro  6.7  /  Alb  3.4  /  TBili  0.5  /  DBili  x   /  AST  40<H>  /  ALT  19  /  AlkPhos  80  07-14    proBNP:     CARDIAC MARKERS:	  RADIOLOGY: < from: Xray Chest 1 View AP- PORTABLE-Urgent (07.14.17 @ 12:05) >  IMPRESSION:   Increasing left pleural effusion and adjacent atelectasis. Persistent   subsegmental atelectasis in right lung base.    OTHER: 	    PREVIOUS DIAGNOSTIC TESTING:    [x] Echocardiogram: < from: Transthoracic Echocardiogram (05.19.17 @ 09:25) >  Conclusions:  1. Mitral annular calcification. Patient status-post  mitraclip placement. Mild-moderate mitral regurgitation.  Peak mitral valve gradient equals 7 mm Hg, mean transmitral  valve gradient equals 3 mm Hg.  2. Aortic valve not well visualized; appears to be a  calcified trileaflet valve with decreased opening. Peak  transaortic valve gradient equals 13 mm Hg, mean  transaortic valve gradient equals 7 mm Hg, estimated aortic  valve area equals 1.3 sqcm (by continuity equation), aortic  valvevelocity time integral equals 34 cm, consistent with  moderate aortic stenosis. Valve gradients are consistent  with mild aortic stenosis. Minimal aortic regurgitation.  3. Moderately dilated left atrium.  LA volume index = 44  cc/m2.  4. Mild concentric left ventricular hypertrophy.  5. Normal left ventricular systolic function. No segmental  wall motion abnormalities.  6. Moderate diastolic dysfunction (Stage II) with elevated  filling pressure.  7. Normal right ventricular size and function.  *** No previous Echo exam.    [ ] Catheterization:   [ ] Stress Test:

## 2017-07-14 NOTE — PROGRESS NOTE ADULT - ASSESSMENT
Patient is a 89y old Female for SBO.  - NPO  - NGT  - Small bowel series  - Contact patient's cardiologist  - F/u lactate levels.

## 2017-07-14 NOTE — PROGRESS NOTE ADULT - SUBJECTIVE AND OBJECTIVE BOX
SUBJECTIVE: Pt seen and examined at bedside. Pt. has productive cough. Patient comfortable and in no-apparent distress. NGT in place - no NVD. Chung in place.   - Not passing gas  - Not passing stool   - Pain controlled     Pain: [ x] YES [ ] NO  Pain Control Adequate: [ x] YES [ ] NO  SOB: [ ]YES [ x] NO  Chest Discomfort: [ ] YES [x ] NO    Nausea: [ ] YES [x ] NO           Vomiting: [ ] YES [ x] NO  Flatus: [ ] YES [x ] NO             Bowel Movement: [ ] YES [x ] NO     Void: [ x]YES [ ]No    Vital Signs Last 24 Hrs  T(C): 36.8 (14 Jul 2017 06:30), Max: 37.2 (13 Jul 2017 12:00)  T(F): 98.2 (14 Jul 2017 06:30), Max: 99 (13 Jul 2017 12:00)  HR: 95 (14 Jul 2017 06:30) (87 - 98)  BP: 149/93 (14 Jul 2017 06:30) (123/67 - 163/93)  BP(mean): --  RR: 18 (14 Jul 2017 06:30) (16 - 19)  SpO2: 95% (14 Jul 2017 06:30) (92% - 99%)    Physical Exam:  General Appearance: Appears well, NAD  Neck: Supple  Chest: Crackles at both lung bases. Equal expansion bilaterally equal breath sounds  CV: Pulse regular presently. S1 S2 no S3 or S4. No MRG, RRR  Abdomen: Soft, nontense, appropriate incisional tenderness, dressings clean and dry and intact  Extremities: Grossly symmetric    LABS:                        12.7   15.20 )-----------( 286      ( 13 Jul 2017 13:40 )             39.1     07-14    138  |  98  |  21  ----------------------------<  79  4.6   |  24  |  0.79    Ca    8.6      14 Jul 2017 05:38  Phos  3.3     07-14  Mg     2.1     07-14    TPro  6.7  /  Alb  3.4  /  TBili  0.5  /  DBili  x   /  AST  40<H>  /  ALT  19  /  AlkPhos  80  07-14          INs and OUTs:    07-13-17 @ 07:01 - 07-14-17 @ 07:00  --------------------------------------------------------  IN: 1000 mL / OUT: 1900 mL / NET: -900 mL    Patient is a 89y old Female s/p  for Other specified intestinal obstruction  .

## 2017-07-14 NOTE — PROGRESS NOTE ADULT - SUBJECTIVE AND OBJECTIVE BOX
SUBJECTIVE: Pt seen and examined at bedside. Pt. has productive cough. Patient comfortable and in no-apparent distress. NGT removed Chung in place. Disoriented X 3 which is new. S/P large B.M and xray confirmation that air and stool are in the rectum, consistent with resolution of SBO. N/G removed and patient started on clear liquids with no vomiting. Cough consistent with new LLL infiltrate and aspiration, when she had the obstruction on July 12,2017 and was with intractable vomiting. Acute change in mental status is delirium related to sepsis + hospital restraints to prevent tube removal + patient's age. Would hold neuro work up and family to provide 1: 1 aide so that patient can be observed, fed, unrestrained and ambulated.       Pain: [ ] YES [x ] NO  Pain Control Adequate: [ ] YES [ ] NO  SOB: [x ]YES [ ] NO  Chest Discomfort: [ ] YES [x ] NO    Nausea: [ ] YES [x ] NO           Vomiting: [ ] YES [ x] NO  Flatus: [ x] YES [ ] NO             Bowel Movement: [x ] YES [ ] NO     Void: [ x]YES [ ]No Has an indwelling Chung    Vital Signs Last 24 Hrs  T(C): 36.8 (14 Jul 2017 06:30), Max: 37.2 (13 Jul 2017 12:00)  T(F): 98.2 (14 Jul 2017 06:30), Max: 99 (13 Jul 2017 12:00)  HR: 95 (14 Jul 2017 06:30) (87 - 98)  BP: 149/93 (14 Jul 2017 06:30) (123/67 - 163/93)  BP(mean): --  RR: 18 (14 Jul 2017 06:30) (16 - 19)  SpO2: 95% (14 Jul 2017 06:30) (92% - 99%)    Physical Exam:  General Appearance: Appears well, NAD  Neck: Supple  Chest: Crackles at both lung bases. Equal expansion bilaterally Decreased breath sounds at the left base.  CV: Pulse regular presently. S1 S2 no S3 or S4. 3/6 HSN at the LLSB to the axilla  Abdomen: Soft, nontense, appropriate incisional tenderness, dressings clean and dry and intact  Extremities: Grossly symmetric    LABS:                        12.7   15.20 )-----------( 286      ( 13 Jul 2017 13:40 ) Wbc improving today             39.1     07-14    138  |  98  |  21  ----------------------------<  79  Normal renal function.  4.6   |  24  |  0.79    Ca    8.6      14 Jul 2017 05:38  Phos  3.3     07-14  Mg     2.1     07-14    TPro  6.7  /  Alb  3.4  /  TBili  0.5  /  DBili  x   /  AST  40<H>  /  ALT  19  /  AlkPhos  80  07-14          INs and OUTs:    07-13-17 @ 07:01  -  07-14-17 @ 07:00  --------------------------------------------------------  IN: 1000 mL / OUT: 1900 mL / NET: -900 mL    Patient is a 89y old Female s/p  for Other specified intestinal obstruction  .

## 2017-07-14 NOTE — PROGRESS NOTE ADULT - SUBJECTIVE AND OBJECTIVE BOX
INTERVAL HPI/OVERNIGHT EVENTS: Pt seen and examined with Dr Osuna. No Gi function. Denies abd pain, nausea, & vomiting.    MEDICATIONS  (STANDING):  enoxaparin Injectable 40 milliGRAM(s) SubCutaneous daily  vancomycin  IVPB 750 milliGRAM(s) IV Intermittent every 24 hours  metoprolol Injectable 5 milliGRAM(s) IV Push every 6 hours  lactated ringers Bolus 500 milliLiter(s) IV Bolus once  dextrose 5% + sodium chloride 0.45%. 1000 milliLiter(s) (100 mL/Hr) IV Continuous <Continuous>    MEDICATIONS  (PRN):      Vital Signs Last 24 Hrs  T(C): 36.8 (14 Jul 2017 06:30), Max: 37.2 (13 Jul 2017 12:00)  T(F): 98.2 (14 Jul 2017 06:30), Max: 99 (13 Jul 2017 12:00)  HR: 95 (14 Jul 2017 06:30) (87 - 98)  BP: 149/93 (14 Jul 2017 06:30) (123/67 - 163/93)  BP(mean): --  RR: 18 (14 Jul 2017 06:30) (16 - 19)  SpO2: 95% (14 Jul 2017 06:30) (92% - 99%)    PHYSICAL EXAM:      Constitutional: AOx3, NAD    Gastrointestinal: Abd soft, NT, improved distention.    NGT: Light brown GI contents, ~ 200 cc in cannister.      I&O's Detail    13 Jul 2017 07:01  -  14 Jul 2017 07:00  --------------------------------------------------------  IN:    IV PiggyBack: 100 mL    lactated ringers.: 900 mL  Total IN: 1000 mL    OUT:    Indwelling Catheter - Urethral: 1725 mL    Nasoenteral Tube: 175 mL  Total OUT: 1900 mL    Total NET: -900 mL          LABS:                        12.7   15.20 )-----------( 286      ( 13 Jul 2017 13:40 )             39.1     07-14    138  |  98  |  21  ----------------------------<  79  4.6   |  24  |  0.79    Ca    8.6      14 Jul 2017 05:38  Phos  3.3     07-14  Mg     2.1     07-14    TPro  6.7  /  Alb  3.4  /  TBili  0.5  /  DBili  x   /  AST  40<H>  /  ALT  19  /  AlkPhos  80  07-14          RADIOLOGY & ADDITIONAL STUDIES:

## 2017-07-14 NOTE — PROGRESS NOTE ADULT - ASSESSMENT
Patient is a 89y old Female for SBO. Presently resolved. Complications include 1) aspiration LLl infiltrate with effusion on CXR 2) Overhydration on admission with 3rd spacing and hypotensive followed by IV lasix X1 and appropriate diuresis 3) post SBO delirium, etiologies as stated above with hopefully transient metabolic encephalopathy.       -  -

## 2017-07-14 NOTE — PROGRESS NOTE ADULT - PROBLEM SELECTOR PLAN 2
I suggest a 5 day course of IV Zosyn, HHN with xopinex TID, maintain upright when feeding and good pulmonary toilet.

## 2017-07-14 NOTE — CONSULT NOTE ADULT - ASSESSMENT
89F HTN, HLD, GERD, reported CAD, diastolic CHF, MR s/p Mitraclip, cognitive impairment, hx of small bowel volvulus/multiple SBO, presents with SBO and acute on chronic diastolic heart failure exacerbation. Hx of mitral regurgitation s/p Mitraclip. Currently fluid overloaded, requiring supplemental O2.  -ideally would diurese the patient as she is high risk of going into worsening pulmonary edema and hypoxic respiratory failure. if there is a contraindication such as the presence of an SBO, then suggest that the standing fluids (maintenance at 75cc/hr) be decreased to allow for the patient to re-equilibrate the intravascular/extravascular volume.   -monitor accurate I/O's, UOP, NGT output.   -daily weights. as of this note pt is 40.5 kg and is fluid overloaded.

## 2017-07-14 NOTE — PROGRESS NOTE ADULT - PROBLEM SELECTOR PLAN 1
- NPO  - Cont NGT  - Small bowel protocol: 100 cc of gastroview down the NGT, clamp tube, and then am Abd xray 4hrs later/ check NGT residual - NPO  - Cont NGT  - Small bowel protocol: 100 cc of gastroview down the NGT, clamp tube, and then  Abd xray 4hrs later

## 2017-07-15 DIAGNOSIS — M41.9 SCOLIOSIS, UNSPECIFIED: ICD-10-CM

## 2017-07-15 DIAGNOSIS — J90 PLEURAL EFFUSION, NOT ELSEWHERE CLASSIFIED: ICD-10-CM

## 2017-07-15 DIAGNOSIS — J45.901 UNSPECIFIED ASTHMA WITH (ACUTE) EXACERBATION: ICD-10-CM

## 2017-07-15 DIAGNOSIS — G93.41 METABOLIC ENCEPHALOPATHY: ICD-10-CM

## 2017-07-15 DIAGNOSIS — K56.69 OTHER INTESTINAL OBSTRUCTION: ICD-10-CM

## 2017-07-15 DIAGNOSIS — J69.0 PNEUMONITIS DUE TO INHALATION OF FOOD AND VOMIT: ICD-10-CM

## 2017-07-15 DIAGNOSIS — I34.0 NONRHEUMATIC MITRAL (VALVE) INSUFFICIENCY: ICD-10-CM

## 2017-07-15 LAB
BUN SERPL-MCNC: 18 MG/DL — SIGNIFICANT CHANGE UP (ref 7–23)
CALCIUM SERPL-MCNC: 8.3 MG/DL — LOW (ref 8.4–10.5)
CHLORIDE SERPL-SCNC: 103 MMOL/L — SIGNIFICANT CHANGE UP (ref 98–107)
CO2 SERPL-SCNC: 27 MMOL/L — SIGNIFICANT CHANGE UP (ref 22–31)
CREAT SERPL-MCNC: 0.59 MG/DL — SIGNIFICANT CHANGE UP (ref 0.5–1.3)
GLUCOSE SERPL-MCNC: 108 MG/DL — HIGH (ref 70–99)
HCT VFR BLD CALC: 35.9 % — SIGNIFICANT CHANGE UP (ref 34.5–45)
HGB BLD-MCNC: 11.8 G/DL — SIGNIFICANT CHANGE UP (ref 11.5–15.5)
MAGNESIUM SERPL-MCNC: 1.9 MG/DL — SIGNIFICANT CHANGE UP (ref 1.6–2.6)
MCHC RBC-ENTMCNC: 30.1 PG — SIGNIFICANT CHANGE UP (ref 27–34)
MCHC RBC-ENTMCNC: 32.9 % — SIGNIFICANT CHANGE UP (ref 32–36)
MCV RBC AUTO: 91.6 FL — SIGNIFICANT CHANGE UP (ref 80–100)
NRBC # FLD: 0 — SIGNIFICANT CHANGE UP
PHOSPHATE SERPL-MCNC: 2.4 MG/DL — LOW (ref 2.5–4.5)
PLATELET # BLD AUTO: 243 K/UL — SIGNIFICANT CHANGE UP (ref 150–400)
PMV BLD: 11.1 FL — SIGNIFICANT CHANGE UP (ref 7–13)
POTASSIUM SERPL-MCNC: 3.2 MMOL/L — LOW (ref 3.5–5.3)
POTASSIUM SERPL-SCNC: 3.2 MMOL/L — LOW (ref 3.5–5.3)
RBC # BLD: 3.92 M/UL — SIGNIFICANT CHANGE UP (ref 3.8–5.2)
RBC # FLD: 15.1 % — HIGH (ref 10.3–14.5)
SODIUM SERPL-SCNC: 142 MMOL/L — SIGNIFICANT CHANGE UP (ref 135–145)
VANCOMYCIN TROUGH SERPL-MCNC: 8.9 UG/ML — LOW (ref 10–20)
WBC # BLD: 9.52 K/UL — SIGNIFICANT CHANGE UP (ref 3.8–10.5)
WBC # FLD AUTO: 9.52 K/UL — SIGNIFICANT CHANGE UP (ref 3.8–10.5)

## 2017-07-15 PROCEDURE — 99232 SBSQ HOSP IP/OBS MODERATE 35: CPT | Mod: GC

## 2017-07-15 RX ORDER — FUROSEMIDE 40 MG
40 TABLET ORAL DAILY
Qty: 0 | Refills: 0 | Status: DISCONTINUED | OUTPATIENT
Start: 2017-07-15 | End: 2017-07-18

## 2017-07-15 RX ORDER — POTASSIUM CHLORIDE 20 MEQ
10 PACKET (EA) ORAL
Qty: 0 | Refills: 0 | Status: COMPLETED | OUTPATIENT
Start: 2017-07-15 | End: 2017-07-15

## 2017-07-15 RX ADMIN — LISINOPRIL 10 MILLIGRAM(S): 2.5 TABLET ORAL at 07:06

## 2017-07-15 RX ADMIN — Medication 100 MILLIEQUIVALENT(S): at 18:53

## 2017-07-15 RX ADMIN — Medication 12.5 MILLIGRAM(S): at 00:03

## 2017-07-15 RX ADMIN — Medication 150 MILLIGRAM(S): at 00:00

## 2017-07-15 RX ADMIN — ENOXAPARIN SODIUM 40 MILLIGRAM(S): 100 INJECTION SUBCUTANEOUS at 22:31

## 2017-07-15 RX ADMIN — Medication 40 MILLIGRAM(S): at 14:06

## 2017-07-15 RX ADMIN — ENOXAPARIN SODIUM 40 MILLIGRAM(S): 100 INJECTION SUBCUTANEOUS at 00:01

## 2017-07-15 RX ADMIN — SODIUM CHLORIDE 50 MILLILITER(S): 9 INJECTION, SOLUTION INTRAVENOUS at 22:29

## 2017-07-15 RX ADMIN — Medication 63.75 MILLIMOLE(S): at 22:31

## 2017-07-15 RX ADMIN — SIMVASTATIN 20 MILLIGRAM(S): 20 TABLET, FILM COATED ORAL at 00:02

## 2017-07-15 RX ADMIN — SIMVASTATIN 20 MILLIGRAM(S): 20 TABLET, FILM COATED ORAL at 22:29

## 2017-07-15 RX ADMIN — Medication 3 MILLILITER(S): at 11:05

## 2017-07-15 RX ADMIN — SODIUM CHLORIDE 75 MILLILITER(S): 9 INJECTION, SOLUTION INTRAVENOUS at 07:09

## 2017-07-15 RX ADMIN — Medication 3 MILLILITER(S): at 19:20

## 2017-07-15 RX ADMIN — Medication 100 MILLIEQUIVALENT(S): at 20:25

## 2017-07-15 RX ADMIN — Medication 12.5 MILLIGRAM(S): at 07:09

## 2017-07-15 RX ADMIN — Medication 100 MILLIEQUIVALENT(S): at 22:30

## 2017-07-15 RX ADMIN — SODIUM CHLORIDE 50 MILLILITER(S): 9 INJECTION, SOLUTION INTRAVENOUS at 14:06

## 2017-07-15 NOTE — PROGRESS NOTE ADULT - SUBJECTIVE AND OBJECTIVE BOX
89 F with recurrent SBO, HTN, HLD, GERD, CAD, CHF admitted for SBO for 3 days.    Vital Signs Last 24 Hrs  T(C): 36.9 (15 Jul 2017 10:00), Max: 36.9 (14 Jul 2017 17:41)  T(F): 98.5 (15 Jul 2017 10:00), Max: 98.5 (14 Jul 2017 21:58)  HR: 88 (15 Jul 2017 10:00) (84 - 97)  BP: 140/88 (15 Jul 2017 10:00) (131/77 - 148/98)  BP(mean): --  RR: 17 (15 Jul 2017 10:00) (17 - 18)  SpO2: 94% (15 Jul 2017 10:00) (94% - 96%)    SUBJECTIVE: Pt seen this AM, at her usual mental status, minimal pain, tolerating oral intake. no overnight event, no acute complaint.     PHYSICAL EXAM:  Constitutional: Patient well nourish, well developed, resting in bed with no acute distress  Respiratory:  rales bilateral on auscultation.   Cardiovascular:  S1, S2, RRR  Gastrointestinal: Abdomen soft, non distended, non tenderness  Genitourinary:  fontana present with clear urine  Extremities:  No edema, no calf tenderrness,  Neurological: AxAxOx2 (place and name)    I&O's Summary    14 Jul 2017 07:01  -  15 Jul 2017 07:00  --------------------------------------------------------  IN: 1350 mL / OUT: 700 mL / NET: 650 mL    15 Jul 2017 07:01  -  15 Jul 2017 10:51  --------------------------------------------------------  IN: 0 mL / OUT: 50 mL / NET: -50 mL      I&O's Detail    14 Jul 2017 07:01  -  15 Jul 2017 07:00  --------------------------------------------------------  IN:    dextrose 5% + sodium chloride 0.45%.: 750 mL    IV PiggyBack: 100 mL    Lactated Ringers IV Bolus: 500 mL  Total IN: 1350 mL    OUT:    Indwelling Catheter - Urethral: 650 mL    Nasoenteral Tube: 50 mL  Total OUT: 700 mL    Total NET: 650 mL      15 Jul 2017 07:01  -  15 Jul 2017 10:51  --------------------------------------------------------  IN:  Total IN: 0 mL    OUT:    Indwelling Catheter - Urethral: 50 mL  Total OUT: 50 mL    Total NET: -50 mL    MEDICATIONS  (STANDING):  enoxaparin Injectable 40 milliGRAM(s) SubCutaneous daily  vancomycin  IVPB 750 milliGRAM(s) IV Intermittent every 24 hours  dextrose 5% + sodium chloride 0.45%. 1000 milliLiter(s) (50 mL/Hr) IV Continuous <Continuous>  lisinopril 10 milliGRAM(s) Oral daily  metoprolol succinate ER 12.5 milliGRAM(s) Oral daily  simvastatin 20 milliGRAM(s) Oral at bedtime    MEDICATIONS  (PRN):  ALBUTerol/ipratropium for Nebulization 3 milliLiter(s) Nebulizer every 6 hours PRN Shortness of Breath and/or Wheezing      LABS:                        11.8   9.52  )-----------( 243      ( 15 Jul 2017 05:50 )             35.9     07-15    142  |  103  |  18  ----------------------------<  108<H>  3.2<L>   |  27  |  0.59    Ca    8.3<L>      15 Jul 2017 05:50  Phos  2.4     07-15  Mg     1.9     07-15    TPro  6.7  /  Alb  3.4  /  TBili  0.5  /  DBili  x   /  AST  40<H>  /  ALT  19  /  AlkPhos  80  07-14

## 2017-07-15 NOTE — PROGRESS NOTE ADULT - PROBLEM SELECTOR PLAN 1
Hopefully will be self limited and not require surgical intervention. Surgery to advance diet and obtain serial x rays as is deemed to be appropriate.

## 2017-07-15 NOTE — PROGRESS NOTE ADULT - ASSESSMENT
Case Summary: 89F HTN, HLD, GERD, reported CAD, diastolic CHF, MR s/p Mitraclip, cognitive impairment, hx of small bowel volvulus/multiple SBO's who presented with another episode of SBO.    Assessment and Plan:    1. CHF, chronic, diastolic dysfunction  - continue Lisinopril and Toprol-XL  - not significantly volume overloaded on exam today (no JVD, no edema, no rales)  - caution with IVF given CHF status, if giving IVF for SBO, please only do so at reduced rate  - obtain proBNP levels    2. Small bowel obstruction, recurrent  - management as per primary surgical team      Roger Singh MD  [id number] / [phone]

## 2017-07-15 NOTE — PROGRESS NOTE ADULT - SUBJECTIVE AND OBJECTIVE BOX
Interval HPI:  Denies CP/SOB. Caregiver at bedside reports that patient had 1 small loose bowel movement today. Abdominal pain is improving.    Review Of Systems:  10 point review of systems is otherwise negative except as mentioned above    Medications:  enoxaparin Injectable 40 milliGRAM(s) SubCutaneous daily  vancomycin  IVPB 750 milliGRAM(s) IV Intermittent every 24 hours  dextrose 5% + sodium chloride 0.45%. 1000 milliLiter(s) IV Continuous <Continuous>  ALBUTerol/ipratropium for Nebulization 3 milliLiter(s) Nebulizer every 6 hours PRN  lisinopril 10 milliGRAM(s) Oral daily  metoprolol succinate ER 12.5 milliGRAM(s) Oral daily  simvastatin 20 milliGRAM(s) Oral at bedtime    PMH/PSH/FH/SH: [ ] Unchanged  Vitals:  T(C): 36.9 (07-15-17 @ 07:02), Max: 36.9 (07-14-17 @ 10:13)  HR: 84 (07-15-17 @ 07:02) (84 - 97)  BP: 147/88 (07-15-17 @ 07:02) (126/101 - 148/98)  BP(mean): --  RR: 18 (07-15-17 @ 07:02) (16 - 18)  SpO2: 94% (07-15-17 @ 07:02) (92% - 96%)  Wt(kg): --  Daily Height in cm: 152.4 (14 Jul 2017 15:17)    Daily   I&O's Summary    14 Jul 2017 07:01  -  15 Jul 2017 07:00  --------------------------------------------------------  IN: 1350 mL / OUT: 700 mL / NET: 650 mL        Physical Exam:  Appearance: No acute distress  HEENT:  mmm. NGT in place  Cardiovascular: s1s2 rrr no LE edema, no JVD  Respiratory: decreased air entry, no crackles, (+) wheezing  Psychiatry: pt awake, alert, but mildly confused  Gastrointestinal:  soft nt	  Skin: scattered ecchymoses  Neurologic: grossly non-focal  Vascular: Peripheral pulses palpable 2+ bilaterally      07-15    142  |  103  |  18  ----------------------------<  108<H>  3.2<L>   |  27  |  0.59    Ca    8.3<L>      15 Jul 2017 05:50  Phos  2.4     07-15  Mg     1.9     07-15    TPro  6.7  /  Alb  3.4  /  TBili  0.5  /  DBili  x   /  AST  40<H>  /  ALT  19  /  AlkPhos  80  07-14                  ECG:    Echo:    Stress Testing:     Cath:    Imaging:    Interpretation of Telemetry:

## 2017-07-15 NOTE — PROGRESS NOTE ADULT - SUBJECTIVE AND OBJECTIVE BOX
INTERVAL HPI/OVERNIGHT EVENTS: Positive BMs, loose    STATUS POST:      POST OPERATIVE DAY #:     MEDICATIONS  (STANDING):  enoxaparin Injectable 40 milliGRAM(s) SubCutaneous daily  vancomycin  IVPB 750 milliGRAM(s) IV Intermittent every 24 hours  dextrose 5% + sodium chloride 0.45%. 1000 milliLiter(s) (50 mL/Hr) IV Continuous <Continuous>  lisinopril 10 milliGRAM(s) Oral daily  metoprolol succinate ER 12.5 milliGRAM(s) Oral daily  simvastatin 20 milliGRAM(s) Oral at bedtime    MEDICATIONS  (PRN):  ALBUTerol/ipratropium for Nebulization 3 milliLiter(s) Nebulizer every 6 hours PRN Shortness of Breath and/or Wheezing      Vital Signs Last 24 Hrs  T(C): 36.9 (15 Jul 2017 07:02), Max: 36.9 (14 Jul 2017 10:13)  T(F): 98.5 (15 Jul 2017 07:02), Max: 98.5 (14 Jul 2017 21:58)  HR: 84 (15 Jul 2017 07:02) (84 - 97)  BP: 147/88 (15 Jul 2017 07:02) (126/101 - 148/98)  BP(mean): --  RR: 18 (15 Jul 2017 07:02) (16 - 18)  SpO2: 94% (15 Jul 2017 07:02) (92% - 96%)  I&O's Detail    14 Jul 2017 07:01  -  15 Jul 2017 07:00  --------------------------------------------------------  IN:    dextrose 5% + sodium chloride 0.45%.: 750 mL    IV PiggyBack: 100 mL    Lactated Ringers IV Bolus: 500 mL  Total IN: 1350 mL    OUT:    Indwelling Catheter - Urethral: 650 mL    Nasoenteral Tube: 50 mL  Total OUT: 700 mL    Total NET: 650 mL          REVIEW OF SYSTEMS:  CONSTITUTIONAL: No weakness, fevers or chills  EYES/ENT: No visual changes;  No vertigo or throat pain   NECK: No pain or stiffness  RESPIRATORY: No cough, wheezing, hemoptysis; No shortness of breath  CARDIOVASCULAR: No chest pain or palpitations  GASTROINTESTINAL: No abdominal or epigastric pain. No nausea, vomiting, or hematemesis; No diarrhea or constipation. No melena or hematochezia.  GENITOURINARY: No dysuria, frequency or hematuria  NEUROLOGICAL: No numbness or weakness  SKIN: No itching, burning, rashes, or lesions   All other review of systems is negative unless indicated above.    Physical Exam:  General: WN/WD NAD  Neurology: A&Ox3, nonfocal, CROSS x 4  Respiratory: CTA B/L  CV: RRR, S1S2, no murmurs, rubs or gallops  Abdominal: Soft, NT, ND +BS  Extremities: No edema, + peripheral pulses        LABS:                        11.8   9.52  )-----------( 243      ( 15 Jul 2017 05:50 )             35.9     07-14    138  |  98  |  21  ----------------------------<  79  4.6   |  24  |  0.79    Ca    8.6      14 Jul 2017 05:38  Phos  3.3     07-14  Mg     2.1     07-14    TPro  6.7  /  Alb  3.4  /  TBili  0.5  /  DBili  x   /  AST  40<H>  /  ALT  19  /  AlkPhos  80  07-14          RADIOLOGY & ADDITIONAL STUDIES:

## 2017-07-15 NOTE — PROGRESS NOTE ADULT - ASSESSMENT
89 F with recurrent SBO, HTN, HLD, GERD, CAD, CHF admitted for SBO for 3 days, improving. Patient passed contrast introduced through NGT yesterday. Patient is prone to fluid overload. Consulted inpatient cardiology and spoke with outpatient cardiologist.    Plan:  ·	Advanced to full liquid supplement ensure  ·	Will start on home medication including oral lassix  ·	Will decrease fluid (to 50cc/h) per cardiology  ·	Duoneb treatment  ·	Will check vanco trough tonight, if good, will d/c vanco tomorrow (patient received 1000mg x1, 750mg x2 so far)  ·	PT consult

## 2017-07-15 NOTE — PROGRESS NOTE ADULT - SUBJECTIVE AND OBJECTIVE BOX
SUBJECTIVE: Pt seen and examined at bedside. Pt. has productive cough. Patient comfortable and in no-apparent distress. NGT removed Chung in place. Disoriented X 3 which is new. S/P large B.M and xray confirmation that air and stool are in the rectum, consistent with resolution of SBO. N/G removed and patient started on clear liquids with no vomiting. Cough consistent with new LLL infiltrate and aspiration, when she had the obstruction on July 12,2017 and was with intractable vomiting. Acute change in mental status is delirium related to sepsis + hospital restraints to prevent tube removal + patient's age. Would hold neuro work up and family to provide 1: 1 aide so that patient can be observed, fed, unrestrained and ambulated.     MEDICATIONS  (STANDING):  enoxaparin Injectable 40 milliGRAM(s) SubCutaneous daily  vancomycin  IVPB 750 milliGRAM(s) IV Intermittent every 24 hours  dextrose 5% + sodium chloride 0.45%. 1000 milliLiter(s) (50 mL/Hr) IV Continuous <Continuous>  lisinopril 10 milliGRAM(s) Oral daily  metoprolol succinate ER 12.5 milliGRAM(s) Oral daily  simvastatin 20 milliGRAM(s) Oral at bedtime  furosemide    Tablet 40 milliGRAM(s) Oral daily    MEDICATIONS  (PRN):  ALBUTerol/ipratropium for Nebulization 3 milliLiter(s) Nebulizer every 6 hours PRN Shortness of Breath and/or Wheezing    Pain: [ ] YES [x ] NO  Pain Control Adequate: [ ] YES [ ] NO  SOB: [x ]YES [ ] NO  Chest Discomfort: [ ] YES [x ] NO    Nausea: [ ] YES [x ] NO           Vomiting: [ ] YES [ x] NO  Flatus: [ x] YES [ ] NO             Bowel Movement: [x ] YES [ ] NO     Void: [ x]YES [ ]No Has an indwelling Chung    Vital Signs Last 24 Hrs  T(C): 36.9 (15 Jul 2017 10:00), Max: 36.9 (14 Jul 2017 17:41)  T(F): 98.5 (15 Jul 2017 10:00), Max: 98.5 (14 Jul 2017 21:58)  HR: 88 (15 Jul 2017 10:00) (84 - 97)  BP: 140/88 (15 Jul 2017 10:00) (131/77 - 147/88)  BP(mean): --  RR: 17 (15 Jul 2017 10:00) (17 - 18)  SpO2: 94% (15 Jul 2017 10:00) (94% - 96%) - 99%)    Physical Exam:  General Appearance: Appears well, NAD  Neck: Supple  Chest: Crackles at both lung bases. Equal expansion bilaterally Decreased breath sounds at the left base.  CV: Pulse regular presently. S1 S2 no S3 or S4. 3/6 HSN at the LLSB to the axilla  Abdomen: Soft, nontense, appropriate incisional tenderness, dressings clean and dry and intact  Extremities: Grossly symmetric    LABS:                    CBC Full  -  ( 15 Jul 2017 05:50 )  WBC Count : 9.52 K/uL  Leukocytosis resolved  Hemoglobin : 11.8 g/dL  Hematocrit : 35.9 %  Platelet Count - Automated : 243 K/uL  Mean Cell Volume : 91.6 fL  Mean Cell Hemoglobin : 30.1 pg  Mean Cell Hemoglobin Concentration : 32.9 %  Auto Neutrophil # : x  Auto Lymphocyte # : x  Auto Monocyte # : x  Auto Eosinophil # : x  Auto Basophil # : x  Auto Neutrophil % : x  Auto Lymphocyte % : x  Auto Monocyte % : x  Auto Eosinophil % : x  Auto Basophil % : x    07-15    142  |  103  |  18  ----------------------------<  108<H>  3.2<L>   |  27  |  0.59    Ca    8.3<L>      15 Jul 2017 05:50  Phos  2.4     07-15  Mg     1.9     07-15    TPro  6.7  /  Alb  3.4  /  TBili  0.5  /  DBili  x   /  AST  40<H>  /  ALT  19  /  AlkPhos  80  07-14    LIVER FUNCTIONS - ( 14 Jul 2017 05:38 )  Alb: 3.4 g/dL / Pro: 6.7 g/dL / ALK PHOS: 80 u/L / ALT: 19 u/L / AST: 40 u/L / GGT: x                         1          INs and OUTs:    07-13-17 @ 07:01  -  07-14-17 @ 07:00  --------------------------------------------------------  IN: 1000 mL / OUT: 1900 mL / NET: -900 mL    Patient is a 89y old Female s/p  for Other specified intestinal obstruction  .

## 2017-07-16 LAB
BUN SERPL-MCNC: 11 MG/DL — SIGNIFICANT CHANGE UP (ref 7–23)
CALCIUM SERPL-MCNC: 8 MG/DL — LOW (ref 8.4–10.5)
CHLORIDE SERPL-SCNC: 99 MMOL/L — SIGNIFICANT CHANGE UP (ref 98–107)
CO2 SERPL-SCNC: 27 MMOL/L — SIGNIFICANT CHANGE UP (ref 22–31)
CREAT SERPL-MCNC: 0.6 MG/DL — SIGNIFICANT CHANGE UP (ref 0.5–1.3)
GLUCOSE SERPL-MCNC: 115 MG/DL — HIGH (ref 70–99)
HCT VFR BLD CALC: 35.4 % — SIGNIFICANT CHANGE UP (ref 34.5–45)
HGB BLD-MCNC: 11.4 G/DL — LOW (ref 11.5–15.5)
MAGNESIUM SERPL-MCNC: 1.8 MG/DL — SIGNIFICANT CHANGE UP (ref 1.6–2.6)
MCHC RBC-ENTMCNC: 29.5 PG — SIGNIFICANT CHANGE UP (ref 27–34)
MCHC RBC-ENTMCNC: 32.2 % — SIGNIFICANT CHANGE UP (ref 32–36)
MCV RBC AUTO: 91.5 FL — SIGNIFICANT CHANGE UP (ref 80–100)
NRBC # FLD: 0 — SIGNIFICANT CHANGE UP
NT-PROBNP SERPL-SCNC: 6426 PG/ML — SIGNIFICANT CHANGE UP
PHOSPHATE SERPL-MCNC: 3.3 MG/DL — SIGNIFICANT CHANGE UP (ref 2.5–4.5)
PLATELET # BLD AUTO: 260 K/UL — SIGNIFICANT CHANGE UP (ref 150–400)
PMV BLD: 10.7 FL — SIGNIFICANT CHANGE UP (ref 7–13)
POTASSIUM SERPL-MCNC: 3.2 MMOL/L — LOW (ref 3.5–5.3)
POTASSIUM SERPL-MCNC: 3.5 MMOL/L — SIGNIFICANT CHANGE UP (ref 3.5–5.3)
POTASSIUM SERPL-SCNC: 3.2 MMOL/L — LOW (ref 3.5–5.3)
POTASSIUM SERPL-SCNC: 3.5 MMOL/L — SIGNIFICANT CHANGE UP (ref 3.5–5.3)
RBC # BLD: 3.87 M/UL — SIGNIFICANT CHANGE UP (ref 3.8–5.2)
RBC # FLD: 14.9 % — HIGH (ref 10.3–14.5)
SODIUM SERPL-SCNC: 139 MMOL/L — SIGNIFICANT CHANGE UP (ref 135–145)
VANCOMYCIN TROUGH SERPL-MCNC: 17.6 UG/ML — SIGNIFICANT CHANGE UP (ref 10–20)
WBC # BLD: 9.25 K/UL — SIGNIFICANT CHANGE UP (ref 3.8–10.5)
WBC # FLD AUTO: 9.25 K/UL — SIGNIFICANT CHANGE UP (ref 3.8–10.5)

## 2017-07-16 RX ORDER — VANCOMYCIN HCL 1 G
VIAL (EA) INTRAVENOUS
Qty: 0 | Refills: 0 | Status: DISCONTINUED | OUTPATIENT
Start: 2017-07-16 | End: 2017-07-17

## 2017-07-16 RX ORDER — VANCOMYCIN HCL 1 G
750 VIAL (EA) INTRAVENOUS ONCE
Qty: 0 | Refills: 0 | Status: COMPLETED | OUTPATIENT
Start: 2017-07-16 | End: 2017-07-16

## 2017-07-16 RX ORDER — POTASSIUM CHLORIDE 20 MEQ
10 PACKET (EA) ORAL
Qty: 0 | Refills: 0 | Status: COMPLETED | OUTPATIENT
Start: 2017-07-16 | End: 2017-07-16

## 2017-07-16 RX ORDER — SODIUM CHLORIDE 9 MG/ML
3 INJECTION INTRAMUSCULAR; INTRAVENOUS; SUBCUTANEOUS EVERY 8 HOURS
Qty: 0 | Refills: 0 | Status: DISCONTINUED | OUTPATIENT
Start: 2017-07-16 | End: 2017-07-21

## 2017-07-16 RX ORDER — VANCOMYCIN HCL 1 G
750 VIAL (EA) INTRAVENOUS EVERY 24 HOURS
Qty: 0 | Refills: 0 | Status: DISCONTINUED | OUTPATIENT
Start: 2017-07-17 | End: 2017-07-17

## 2017-07-16 RX ORDER — PANTOPRAZOLE SODIUM 20 MG/1
40 TABLET, DELAYED RELEASE ORAL
Qty: 0 | Refills: 0 | Status: DISCONTINUED | OUTPATIENT
Start: 2017-07-16 | End: 2017-07-21

## 2017-07-16 RX ORDER — POTASSIUM CHLORIDE 20 MEQ
40 PACKET (EA) ORAL ONCE
Qty: 0 | Refills: 0 | Status: COMPLETED | OUTPATIENT
Start: 2017-07-16 | End: 2017-07-16

## 2017-07-16 RX ADMIN — Medication 40 MILLIGRAM(S): at 06:32

## 2017-07-16 RX ADMIN — Medication 3 MILLILITER(S): at 06:56

## 2017-07-16 RX ADMIN — SIMVASTATIN 20 MILLIGRAM(S): 20 TABLET, FILM COATED ORAL at 22:51

## 2017-07-16 RX ADMIN — Medication 100 MILLIEQUIVALENT(S): at 10:41

## 2017-07-16 RX ADMIN — SODIUM CHLORIDE 3 MILLILITER(S): 9 INJECTION INTRAMUSCULAR; INTRAVENOUS; SUBCUTANEOUS at 15:42

## 2017-07-16 RX ADMIN — Medication 3 MILLILITER(S): at 13:27

## 2017-07-16 RX ADMIN — Medication 12.5 MILLIGRAM(S): at 06:32

## 2017-07-16 RX ADMIN — Medication 100 MILLIEQUIVALENT(S): at 13:00

## 2017-07-16 RX ADMIN — ENOXAPARIN SODIUM 40 MILLIGRAM(S): 100 INJECTION SUBCUTANEOUS at 22:50

## 2017-07-16 RX ADMIN — Medication 40 MILLIEQUIVALENT(S): at 22:51

## 2017-07-16 RX ADMIN — LISINOPRIL 10 MILLIGRAM(S): 2.5 TABLET ORAL at 06:32

## 2017-07-16 RX ADMIN — SODIUM CHLORIDE 3 MILLILITER(S): 9 INJECTION INTRAMUSCULAR; INTRAVENOUS; SUBCUTANEOUS at 22:38

## 2017-07-16 RX ADMIN — Medication 150 MILLIGRAM(S): at 01:03

## 2017-07-16 RX ADMIN — Medication 100 MILLIEQUIVALENT(S): at 11:30

## 2017-07-16 NOTE — PHYSICAL THERAPY INITIAL EVALUATION ADULT - GENERAL OBSERVATIONS, REHAB EVAL
Pt encountered in semisupine position, no distress, AxOx1, with +IV, +fontana (out;RN made aware), Left hand/forearm swelling/redness

## 2017-07-16 NOTE — PHYSICAL THERAPY INITIAL EVALUATION ADULT - ADDITIONAL COMMENTS
Pt is a poor historian; information needs to be verified by family member. Pt reports that she lives in a private house with her son with ~6 MALINDA; bedroom/bathroom on first floor. Prior to hospital admission pt ambulated with assistance using Rolling walker. Pt has a home health aide ( unsure how many days/hours)     Pt left comfortable in bed, NAD, all precautions maintained, with call bell in reach, bed alarm on and RN made aware of PT evaluation and made aware of fontana cath out.

## 2017-07-16 NOTE — PHYSICAL THERAPY INITIAL EVALUATION ADULT - ACTIVE RANGE OF MOTION EXAMINATION, REHAB EVAL
bilateral  lower extremity Active ROM was WFL (within functional limits)/Bilateral Shoulder flexion ~85 degrees

## 2017-07-16 NOTE — PROGRESS NOTE ADULT - PROBLEM SELECTOR PLAN 2
I suggest a 5 day course of IV Zosyn, HHN with xopinex TID, maintain upright when feeding and good pulmonary toilet. Now finishing a 5 day course of vancomycin

## 2017-07-16 NOTE — PHYSICAL THERAPY INITIAL EVALUATION ADULT - CRITERIA FOR SKILLED THERAPEUTIC INTERVENTIONS
rehab potential/risk reduction/prevention/impairments found/functional limitations in following categories

## 2017-07-16 NOTE — PROGRESS NOTE ADULT - ASSESSMENT
Patient is a 89y old Female with SBO.  - Diet, Regular  - D/c Chung  - Finish 5th day of Vancomycin, f/u trough      - pt. has penicillin allergy and will likely start cipro/flagyl  - Continue Toprol XL and Lisinopril  - Serum pro-BNP  - Pulmonary toileting and sit patient upright in bed.   - PT recommends Rehab.

## 2017-07-16 NOTE — PROGRESS NOTE ADULT - SUBJECTIVE AND OBJECTIVE BOX
SUBJECTIVE: Pt seen and examined at bedside. Pt. has productive cough. Patient comfortable and in no-apparent distress. NGT removed Chung in place. Disoriented X 3 which is new. S/P large B.M and xray confirmation that air and stool are in the rectum, consistent with resolution of SBO. N/G removed and patient started on clear liquids with no vomiting. Cough consistent with new LLL infiltrate and aspiration, when she had the obstruction on July 12,2017 and was with intractable vomiting. Acute change in mental status is delirium related to sepsis + hospital restraints to prevent tube removal + patient's age. Would hold neuro work up and family to provide 1: 1 aide so that patient can be observed, fed, unrestrained and ambulated. Mental status is improved today.     MEDICATIONS  (STANDING):    MEDICATIONS  (STANDING):  enoxaparin Injectable 40 milliGRAM(s) SubCutaneous daily  lisinopril 10 milliGRAM(s) Oral daily  metoprolol succinate ER 12.5 milliGRAM(s) Oral daily  simvastatin 20 milliGRAM(s) Oral at bedtime  furosemide    Tablet 40 milliGRAM(s) Oral daily  vancomycin  IVPB   IV Intermittent   sodium chloride 0.9% lock flush 3 milliLiter(s) IV Push every 8 hours  potassium chloride  10 mEq/100 mL IVPB 10 milliEquivalent(s) IV Intermittent every 1 hour    MEDICATIONS  (PRN):  ALBUTerol/ipratropium for Nebulization 3 milliLiter(s) Nebulizer every 6 hours PRN Shortness of Breath and/or Wheezing      Pain: [ ] YES [x ] NO  Pain Control Adequate: [ ] YES [ ] NO  SOB: [x ]YES [ ] NO  Chest Discomfort: [ ] YES [x ] NO    Nausea: [ ] YES [x ] NO           Vomiting: [ ] YES [ x] NO  Flatus: [ x] YES [ ] NO             Bowel Movement: [x ] YES [ ] NO     Void: [ x]YES [ ]No Has an indwelling Chung    Vital Signs Last 24 Hrs  T(C): 36.5 (16 Jul 2017 10:00), Max: 37.3 (15 Jul 2017 22:02)  T(F): 97.7 (16 Jul 2017 10:00), Max: 99.1 (15 Jul 2017 22:02)  HR: 84 (16 Jul 2017 10:00) (84 - 103)  BP: 140/81 (16 Jul 2017 10:00) (108/69 - 147/98)  BP(mean): --  RR: 17 (16 Jul 2017 10:00) (17 - 17)  SpO2: 97% (16 Jul 2017 10:00) (94% - 99%)    Physical Exam:  General Appearance: Appears well, NAD  Neck: Supple  Chest: Crackles at both lung bases. Equal expansion bilaterally Decreased breath sounds at the left base.  CV: Pulse regular presently. S1 S2 no S3 or S4. 3/6 HSN at the LLSB to the axilla  Abdomen: Soft, nontense, appropriate incisional tenderness, dressings clean and dry and intact  Extremities: Grossly symmetric    LABS:                   CBC Full  -  ( 16 Jul 2017 06:10 )  WBC Count : 9.25 K/uL  Hemoglobin : 11.4 g/dL  Hematocrit : 35.4 %  Platelet Count - Automated : 260 K/uL  Mean Cell Volume : 91.5 fL  Mean Cell Hemoglobin : 29.5 pg  Mean Cell Hemoglobin Concentration : 32.2 %  Auto Neutrophil # : x  Auto Lymphocyte # : x  Auto Monocyte # : x  Auto Eosinophil # : x  Auto Basophil # : x  Auto Neutrophil % : x  Auto Lymphocyte % : x  Auto Monocyte % : x  Auto Eosinophil % : x  Auto Basophil % : x    07-16    139  |  99  |  11  ----------------------------<  115<H>  3.2<L>   |  27  |  0.60    Ca    8.0<L>      16 Jul 2017 06:10  Phos  3.3     07-16  Mg     1.8     07-16        Patient is a 89y old Female s/p  for Other specified intestinal obstruction  .

## 2017-07-16 NOTE — PROGRESS NOTE ADULT - SUBJECTIVE AND OBJECTIVE BOX
SUBJECTIVE: Pt seen and examined at bedside. No overnight events. Patient comfortable and in no-apparent distress. No nausea, vomiting, or diarrhea. Alert but not oriented - baseline not oriented. Cough present.   - Passing gas  - Passing stool   - Pain controlled     Pain: [ ] YES [ x] NO  SOB: [ ]YES [x ] NO  Chest Discomfort: [ ] YES [x ] NO    Nausea: [ ] YES [ x] NO           Vomiting: [ ] YES [x ] NO  Flatus: [ x] YES [ ] NO             Bowel Movement: [ ] YES [ ] NO     Void: [ ]YES [ ]No    Vital Signs Last 24 Hrs  T(C): 36.7 (16 Jul 2017 06:27), Max: 37.3 (15 Jul 2017 22:02)  T(F): 98.1 (16 Jul 2017 06:27), Max: 99.1 (15 Jul 2017 22:02)  HR: 91 (16 Jul 2017 06:27) (88 - 103)  BP: 147/85 (16 Jul 2017 06:27) (108/69 - 147/98)  BP(mean): --  RR: 17 (16 Jul 2017 06:27) (17 - 18)  SpO2: 95% (16 Jul 2017 06:27) (94% - 99%)    Physical Exam:  General Appearance: Appears well, NAD  Neck: Supple  Chest: Equal expansion bilaterally, equal breath sounds  CV: Pulse regular presently  Abdomen: Soft, nontense, appropriate incisional tenderness, dressings clean and dry and intact  Extremities: Grossly symmetric    LABS:                        11.4   9.25  )-----------( 260      ( 16 Jul 2017 06:10 )             35.4     07-16    139  |  99  |  11  ----------------------------<  115<H>  3.2<L>   |  27  |  0.60    Ca    8.0<L>      16 Jul 2017 06:10  Phos  3.3     07-16  Mg     1.8     07-16            INs and OUTs:    07-15-17 @ 07:01  -  07-16-17 @ 07:00  --------------------------------------------------------  IN: 1000 mL / OUT: 1650 mL / NET: -650 mL SUBJECTIVE: Pt seen and examined at bedside. No overnight events. Patient comfortable and in no-apparent distress. No nausea, vomiting, or diarrhea. Alert but not oriented - baseline not oriented. Cough present.   - Passing gas  - Passing stool   - Pain controlled     Pain: [ ] YES [ x] NO  SOB: [ ]YES [x ] NO  Chest Discomfort: [ ] YES [x ] NO    Nausea: [ ] YES [ x] NO           Vomiting: [ ] YES [x ] NO  Flatus: [ x] YES [ ] NO             Bowel Movement: [ x] YES [ ] NO       Vital Signs Last 24 Hrs  T(C): 36.7 (16 Jul 2017 06:27), Max: 37.3 (15 Jul 2017 22:02)  T(F): 98.1 (16 Jul 2017 06:27), Max: 99.1 (15 Jul 2017 22:02)  HR: 91 (16 Jul 2017 06:27) (88 - 103)  BP: 147/85 (16 Jul 2017 06:27) (108/69 - 147/98)  BP(mean): --  RR: 17 (16 Jul 2017 06:27) (17 - 18)  SpO2: 95% (16 Jul 2017 06:27) (94% - 99%)    Physical Exam:  General Appearance: Appears well, NAD, Alert and disoriented at baseline  Neck: Supple  Chest: Equal expansion bilaterally, equal breath sounds  CV: Pulse regular presently  Abdomen: Soft, nontense, NTND  Extremities: Grossly symmetric    LABS:                        11.4   9.25  )-----------( 260      ( 16 Jul 2017 06:10 )             35.4     07-16    139  |  99  |  11  ----------------------------<  115<H>  3.2<L>   |  27  |  0.60    Ca    8.0<L>      16 Jul 2017 06:10  Phos  3.3     07-16  Mg     1.8     07-16            INs and OUTs:    07-15-17 @ 07:01  -  07-16-17 @ 07:00  --------------------------------------------------------  IN: 1000 mL / OUT: 1650 mL / NET: -650 mL

## 2017-07-16 NOTE — PHYSICAL THERAPY INITIAL EVALUATION ADULT - DIAGNOSIS, PT EVAL
Pt admitted for abdominal pain; pt presents with decreased strength, decreased balance, and difficulty with ambulation.

## 2017-07-17 ENCOUNTER — TRANSCRIPTION ENCOUNTER (OUTPATIENT)
Age: 82
End: 2017-07-17

## 2017-07-17 LAB
BACTERIA BLD CULT: SIGNIFICANT CHANGE UP
BACTERIA BLD CULT: SIGNIFICANT CHANGE UP
BUN SERPL-MCNC: 10 MG/DL — SIGNIFICANT CHANGE UP (ref 7–23)
CALCIUM SERPL-MCNC: 8.7 MG/DL — SIGNIFICANT CHANGE UP (ref 8.4–10.5)
CHLORIDE SERPL-SCNC: 102 MMOL/L — SIGNIFICANT CHANGE UP (ref 98–107)
CO2 SERPL-SCNC: 27 MMOL/L — SIGNIFICANT CHANGE UP (ref 22–31)
CREAT SERPL-MCNC: 0.72 MG/DL — SIGNIFICANT CHANGE UP (ref 0.5–1.3)
GLUCOSE SERPL-MCNC: 92 MG/DL — SIGNIFICANT CHANGE UP (ref 70–99)
HCT VFR BLD CALC: 38.3 % — SIGNIFICANT CHANGE UP (ref 34.5–45)
HGB BLD-MCNC: 12.4 G/DL — SIGNIFICANT CHANGE UP (ref 11.5–15.5)
MCHC RBC-ENTMCNC: 29.2 PG — SIGNIFICANT CHANGE UP (ref 27–34)
MCHC RBC-ENTMCNC: 32.4 % — SIGNIFICANT CHANGE UP (ref 32–36)
MCV RBC AUTO: 90.1 FL — SIGNIFICANT CHANGE UP (ref 80–100)
NRBC # FLD: 0 — SIGNIFICANT CHANGE UP
PLATELET # BLD AUTO: 285 K/UL — SIGNIFICANT CHANGE UP (ref 150–400)
PMV BLD: 11 FL — SIGNIFICANT CHANGE UP (ref 7–13)
POTASSIUM SERPL-MCNC: 4.4 MMOL/L — SIGNIFICANT CHANGE UP (ref 3.5–5.3)
POTASSIUM SERPL-SCNC: 4.4 MMOL/L — SIGNIFICANT CHANGE UP (ref 3.5–5.3)
RBC # BLD: 4.25 M/UL — SIGNIFICANT CHANGE UP (ref 3.8–5.2)
RBC # FLD: 15.1 % — HIGH (ref 10.3–14.5)
SODIUM SERPL-SCNC: 141 MMOL/L — SIGNIFICANT CHANGE UP (ref 135–145)
WBC # BLD: 9.12 K/UL — SIGNIFICANT CHANGE UP (ref 3.8–10.5)
WBC # FLD AUTO: 9.12 K/UL — SIGNIFICANT CHANGE UP (ref 3.8–10.5)

## 2017-07-17 RX ADMIN — ENOXAPARIN SODIUM 40 MILLIGRAM(S): 100 INJECTION SUBCUTANEOUS at 21:36

## 2017-07-17 RX ADMIN — Medication 3 MILLILITER(S): at 07:38

## 2017-07-17 RX ADMIN — SODIUM CHLORIDE 3 MILLILITER(S): 9 INJECTION INTRAMUSCULAR; INTRAVENOUS; SUBCUTANEOUS at 07:01

## 2017-07-17 RX ADMIN — SODIUM CHLORIDE 3 MILLILITER(S): 9 INJECTION INTRAMUSCULAR; INTRAVENOUS; SUBCUTANEOUS at 21:36

## 2017-07-17 RX ADMIN — LISINOPRIL 10 MILLIGRAM(S): 2.5 TABLET ORAL at 06:57

## 2017-07-17 RX ADMIN — Medication 12.5 MILLIGRAM(S): at 06:57

## 2017-07-17 RX ADMIN — Medication 40 MILLIGRAM(S): at 06:57

## 2017-07-17 RX ADMIN — SIMVASTATIN 20 MILLIGRAM(S): 20 TABLET, FILM COATED ORAL at 21:36

## 2017-07-17 RX ADMIN — PANTOPRAZOLE SODIUM 40 MILLIGRAM(S): 20 TABLET, DELAYED RELEASE ORAL at 06:59

## 2017-07-17 RX ADMIN — Medication 150 MILLIGRAM(S): at 00:00

## 2017-07-17 RX ADMIN — SODIUM CHLORIDE 3 MILLILITER(S): 9 INJECTION INTRAMUSCULAR; INTRAVENOUS; SUBCUTANEOUS at 14:13

## 2017-07-17 NOTE — DISCHARGE NOTE ADULT - CARE PLAN
Principal Discharge DX:	Small bowel obstruction  Goal:	managed conservatively with NGT placement  Instructions for follow-up, activity and diet:	ACTIVITY: No heavy lifting or straining. Otherwise, you may return to your usual level of physical activity. If you are taking narcotic pain medication (such as Percocet) DO NOT drive a car, operate machinery or make important decisions.  DIET: Return to your usual diet.  NOTIFY YOUR SURGEON IF: You have any fever (over 100.4 F) or chills, persistent nausea/vomiting, persistent diarrhea, or if your pain is not controlled or no longer passing gas/having bowel movements.  FOLLOW-UP: Please follow up with your primary care physician and Cardiologist Dr Broderick León in one week regarding your hospitalization  Please f/u with Dr Senior as an outpatient, please call [phone] to schedule appointment  Please complete full course of antibiotics as prescribed.  Secondary Diagnosis:	Aspiration pneumonia of left lower lobe due to vomit  Instructions for follow-up, activity and diet:	please complete 7 day course Levaquin  Secondary Diagnosis:	CAD (coronary artery disease)  Instructions for follow-up, activity and diet:	please f/u with your PCP/Cardiologist Dr Broderick León as an outpatient in 1-2 weeks, please call to schedule appointment  Secondary Diagnosis:	CHF (congestive heart failure)  Instructions for follow-up, activity and diet:	please f/u with your PCP/Cardiologist Dr Broderick León as an outpatient in 1-2 weeks, please call to schedule appointment Principal Discharge DX:	Small bowel obstruction  Goal:	managed conservatively with NGT placement  Instructions for follow-up, activity and diet:	ACTIVITY: No heavy lifting or straining. Otherwise, you may return to your usual level of physical activity. If you are taking narcotic pain medication (such as Percocet) DO NOT drive a car, operate machinery or make important decisions.  DIET: Return to your usual diet.  NOTIFY YOUR SURGEON IF: You have any fever (over 100.4 F) or chills, persistent nausea/vomiting, persistent diarrhea, or if your pain is not controlled or no longer passing gas/having bowel movements.  FOLLOW-UP: Please follow up with your primary care physician and Cardiologist Dr Broderick León in one week regarding your hospitalization  Please f/u with Dr Senior as an outpatient, please call [phone] to schedule appointment  Please complete full course of antibiotics as prescribed.  Secondary Diagnosis:	Aspiration pneumonia of left lower lobe due to vomit  Instructions for follow-up, activity and diet:	please complete 7 day course Levaquin  Secondary Diagnosis:	CAD (coronary artery disease)  Instructions for follow-up, activity and diet:	please f/u with your PCP/Cardiologist Dr Broderick León as an outpatient in 1-2 weeks, please call to schedule appointment  Secondary Diagnosis:	CHF (congestive heart failure)  Instructions for follow-up, activity and diet:	please f/u with your PCP/Cardiologist Dr Broderick León as an outpatient in 1-2 weeks, please call to schedule appointment Principal Discharge DX:	Small bowel obstruction  Goal:	managed conservatively with NGT placement  Instructions for follow-up, activity and diet:	ACTIVITY: No heavy lifting or straining. Otherwise, you may return to your usual level of physical activity. If you are taking narcotic pain medication (such as Percocet) DO NOT drive a car, operate machinery or make important decisions.  DIET: Return to your usual diet.  NOTIFY YOUR SURGEON IF: You have any fever (over 100.4 F) or chills, persistent nausea/vomiting, persistent diarrhea, or if your pain is not controlled or no longer passing gas/having bowel movements.  FOLLOW-UP: Please follow up with your primary care physician and Cardiologist Dr Broderick León in one week regarding your hospitalization  Please f/u with Dr Senior as an outpatient, please call [phone] to schedule appointment  Please complete full course of antibiotics as prescribed.  Secondary Diagnosis:	Aspiration pneumonia of left lower lobe due to vomit  Instructions for follow-up, activity and diet:	please complete 7 day course Levaquin  Secondary Diagnosis:	CAD (coronary artery disease)  Instructions for follow-up, activity and diet:	please f/u with your PCP/Cardiologist Dr Broderick León as an outpatient in 1-2 weeks, please call to schedule appointment  Secondary Diagnosis:	CHF (congestive heart failure)  Instructions for follow-up, activity and diet:	please f/u with your PCP/Cardiologist Dr Broderick León as an outpatient in 1-2 weeks, please call to schedule appointment Principal Discharge DX:	Small bowel obstruction  Goal:	managed conservatively with NGT placement  Instructions for follow-up, activity and diet:	ACTIVITY: No heavy lifting or straining. Otherwise, you may return to your usual level of physical activity. If you are taking narcotic pain medication (such as Percocet) DO NOT drive a car, operate machinery or make important decisions.  DIET: Return to your usual diet.  NOTIFY YOUR SURGEON IF: You have any fever (over 100.4 F) or chills, persistent nausea/vomiting, persistent diarrhea, or if your pain is not controlled or no longer passing gas/having bowel movements.  FOLLOW-UP: Please follow up with your primary care physician and Cardiologist Dr Broderick León in one week regarding your hospitalization  Please f/u with Dr Senior as an outpatient, please call [phone] to schedule appointment  Please complete full course of antibiotics as prescribed.  Secondary Diagnosis:	Aspiration pneumonia of left lower lobe due to vomit  Instructions for follow-up, activity and diet:	please complete 7 day course Levaquin  Secondary Diagnosis:	CAD (coronary artery disease)  Instructions for follow-up, activity and diet:	please f/u with your PCP/Cardiologist Dr Broderick León as an outpatient in 1-2 weeks, please call to schedule appointment  Secondary Diagnosis:	CHF (congestive heart failure)  Instructions for follow-up, activity and diet:	please f/u with your PCP/Cardiologist Dr Broderick León as an outpatient in 1-2 weeks, please call to schedule appointment Principal Discharge DX:	Small bowel obstruction  Goal:	managed conservatively with NGT placement  Instructions for follow-up, activity and diet:	ACTIVITY: No heavy lifting or straining. Otherwise, you may return to your usual level of physical activity. If you are taking narcotic pain medication (such as Percocet) DO NOT drive a car, operate machinery or make important decisions.  DIET: Return to your usual diet.  NOTIFY YOUR SURGEON IF: You have any fever (over 100.4 F) or chills, persistent nausea/vomiting, persistent diarrhea, or if your pain is not controlled or no longer passing gas/having bowel movements.  FOLLOW-UP: Please follow up with your primary care physician and Cardiologist Dr Broderick León in one week regarding your hospitalization  Please f/u with Dr Senior as an outpatient, please call [phone] to schedule appointment  Please complete full course of antibiotics as prescribed.  Secondary Diagnosis:	Aspiration pneumonia of left lower lobe due to vomit  Instructions for follow-up, activity and diet:	please complete 7 day course Levaquin  Secondary Diagnosis:	CAD (coronary artery disease)  Instructions for follow-up, activity and diet:	please f/u with your PCP/Cardiologist Dr Broderick León as an outpatient in 1-2 weeks, please call to schedule appointment  Secondary Diagnosis:	CHF (congestive heart failure)  Instructions for follow-up, activity and diet:	please f/u with your PCP/Cardiologist Dr Broderick León as an outpatient in 1-2 weeks, please call to schedule appointment Principal Discharge DX:	Small bowel obstruction  Goal:	managed conservatively with NGT placement  Instructions for follow-up, activity and diet:	ACTIVITY: No heavy lifting or straining. Otherwise, you may return to your usual level of physical activity. If you are taking narcotic pain medication (such as Percocet) DO NOT drive a car, operate machinery or make important decisions.  DIET: Return to your usual diet.  NOTIFY YOUR SURGEON IF: You have any fever (over 100.4 F) or chills, persistent nausea/vomiting, persistent diarrhea, or if your pain is not controlled or no longer passing gas/having bowel movements.  FOLLOW-UP: Please follow up with your primary care physician and Cardiologist Dr Broderick León in one week regarding your hospitalization  Please f/u with Dr Senior as an outpatient, please call [phone] to schedule appointment  Please complete full course of antibiotics as prescribed.  Secondary Diagnosis:	Aspiration pneumonia of left lower lobe due to vomit  Instructions for follow-up, activity and diet:	please complete 10 day course Levaquin  Secondary Diagnosis:	CAD (coronary artery disease)  Instructions for follow-up, activity and diet:	please f/u with your PCP/Cardiologist Dr Broderick León as an outpatient in 1-2 weeks, please call to schedule appointment  Secondary Diagnosis:	CHF (congestive heart failure)  Instructions for follow-up, activity and diet:	please f/u with your PCP/Cardiologist Dr Broderick León as an outpatient in 1-2 weeks, please call to schedule appointment Principal Discharge DX:	Small bowel obstruction  Goal:	managed conservatively with NGT placement  Instructions for follow-up, activity and diet:	ACTIVITY: No heavy lifting or straining. Otherwise, you may return to your usual level of physical activity. If you are taking narcotic pain medication (such as Percocet) DO NOT drive a car, operate machinery or make important decisions.  DIET: Return to your usual diet.  NOTIFY YOUR SURGEON IF: You have any fever (over 100.4 F) or chills, persistent nausea/vomiting, persistent diarrhea, or if your pain is not controlled or no longer passing gas/having bowel movements.  FOLLOW-UP: Please follow up with your primary care physician and Cardiologist Dr Broderick León in one week regarding your hospitalization  Please f/u with Dr Senior as an outpatient, please call [phone] to schedule appointment  Please complete full course of antibiotics as prescribed.  Secondary Diagnosis:	Aspiration pneumonia of left lower lobe due to vomit  Instructions for follow-up, activity and diet:	please complete 10 day course Levaquin  Secondary Diagnosis:	CAD (coronary artery disease)  Instructions for follow-up, activity and diet:	please f/u with your PCP/Cardiologist Dr Broderick León as an outpatient in 1-2 weeks, please call to schedule appointment  Secondary Diagnosis:	CHF (congestive heart failure)  Instructions for follow-up, activity and diet:	please f/u with your PCP/Cardiologist Dr Broderick León as an outpatient in 1-2 weeks, please call to schedule appointment Principal Discharge DX:	Small bowel obstruction  Goal:	managed conservatively with NGT placement  Instructions for follow-up, activity and diet:	ACTIVITY: No heavy lifting or straining. Otherwise, you may return to your usual level of physical activity. If you are taking narcotic pain medication (such as Percocet) DO NOT drive a car, operate machinery or make important decisions.  DIET: Return to your usual diet.  NOTIFY YOUR SURGEON IF: You have any fever (over 100.4 F) or chills, persistent nausea/vomiting, persistent diarrhea, or if your pain is not controlled or no longer passing gas/having bowel movements.  FOLLOW-UP: Please follow up with your primary care physician and Cardiologist Dr Broderick León in one week regarding your hospitalization  Please f/u with Dr Senior as an outpatient, please call [phone] to schedule appointment  Please complete full course of antibiotics as prescribed.  Secondary Diagnosis:	Aspiration pneumonia of left lower lobe due to vomit  Instructions for follow-up, activity and diet:	please complete 10 day course Levaquin  Secondary Diagnosis:	CAD (coronary artery disease)  Instructions for follow-up, activity and diet:	please f/u with your PCP/Cardiologist Dr Broderick León as an outpatient in 1-2 weeks, please call to schedule appointment  Secondary Diagnosis:	CHF (congestive heart failure)  Instructions for follow-up, activity and diet:	please f/u with your PCP/Cardiologist Dr Broderick León as an outpatient in 1-2 weeks, please call to schedule appointment. Your lasix was stopped prior to discharge, please follow-up your PMD as to continuation. Principal Discharge DX:	Small bowel obstruction  Goal:	managed conservatively with NGT placement  Instructions for follow-up, activity and diet:	ACTIVITY: No heavy lifting or straining. Otherwise, you may return to your usual level of physical activity. If you are taking narcotic pain medication (such as Percocet) DO NOT drive a car, operate machinery or make important decisions.  DIET: Return to your usual diet.  NOTIFY YOUR SURGEON IF: You have any fever (over 100.4 F) or chills, persistent nausea/vomiting, persistent diarrhea, or if your pain is not controlled or no longer passing gas/having bowel movements.  FOLLOW-UP: Please follow up with your primary care physician and Cardiologist Dr Broderick León in one week regarding your hospitalization  Please f/u with Dr Senior as an outpatient, please call [phone] to schedule appointment  Please complete full course of antibiotics as prescribed.  Secondary Diagnosis:	Aspiration pneumonia of left lower lobe due to vomit  Instructions for follow-up, activity and diet:	please complete 10 day course Levaquin  Secondary Diagnosis:	CAD (coronary artery disease)  Instructions for follow-up, activity and diet:	please f/u with your PCP/Cardiologist Dr Broderick eLón as an outpatient in 1-2 weeks, please call to schedule appointment  Secondary Diagnosis:	CHF (congestive heart failure)  Instructions for follow-up, activity and diet:	please f/u with your PCP/Cardiologist Dr Broderick León as an outpatient in 1-2 weeks, please call to schedule appointment. Your lasix was stopped prior to discharge to maintain appropriate blood pressure, please follow-up your PMD as to continuation. Principal Discharge DX:	Small bowel obstruction  Goal:	managed conservatively with NGT placement  Instructions for follow-up, activity and diet:	ACTIVITY: No heavy lifting or straining. Otherwise, you may return to your usual level of physical activity. If you are taking narcotic pain medication (such as Percocet) DO NOT drive a car, operate machinery or make important decisions.  DIET: Return to your usual diet.  NOTIFY YOUR SURGEON IF: You have any fever (over 100.4 F) or chills, persistent nausea/vomiting, persistent diarrhea, or if your pain is not controlled or no longer passing gas/having bowel movements.  FOLLOW-UP: Please follow up with your primary care physician and Cardiologist Dr Broderick León in one week regarding your hospitalization  Please f/u with Dr Senior as an outpatient, please call [phone] to schedule appointment  Please complete full course of antibiotics as prescribed.  Secondary Diagnosis:	Aspiration pneumonia of left lower lobe due to vomit  Instructions for follow-up, activity and diet:	please complete 10 day course Levaquin  Secondary Diagnosis:	CAD (coronary artery disease)  Instructions for follow-up, activity and diet:	please f/u with your PCP/Cardiologist Dr Broderick León as an outpatient in 1-2 weeks, please call to schedule appointment  Secondary Diagnosis:	CHF (congestive heart failure)  Instructions for follow-up, activity and diet:	please f/u with your PCP/Cardiologist Dr Broderick León as an outpatient in 1-2 weeks, please call to schedule appointment. Your lasix was stopped prior to discharge to maintain appropriate blood pressure, please follow-up your PMD as to continuation. Principal Discharge DX:	Small bowel obstruction  Goal:	managed conservatively with NGT placement  Instructions for follow-up, activity and diet:	ACTIVITY: No heavy lifting or straining. Otherwise, you may return to your usual level of physical activity. If you are taking narcotic pain medication (such as Percocet) DO NOT drive a car, operate machinery or make important decisions.  DIET: Return to your usual diet.  NOTIFY YOUR SURGEON IF: You have any fever (over 100.4 F) or chills, persistent nausea/vomiting, persistent diarrhea, or if your pain is not controlled or no longer passing gas/having bowel movements.  FOLLOW-UP: Please follow up with your primary care physician and Cardiologist Dr Broderick León in one week regarding your hospitalization  Please f/u with Dr Senior as an outpatient, please call [phone] to schedule appointment  Please complete full course of antibiotics as prescribed.  Secondary Diagnosis:	Aspiration pneumonia of left lower lobe due to vomit  Instructions for follow-up, activity and diet:	please complete 10 day course Levaquin  Secondary Diagnosis:	CAD (coronary artery disease)  Instructions for follow-up, activity and diet:	please f/u with your PCP/Cardiologist Dr Broderick León as an outpatient in 1-2 weeks, please call to schedule appointment  Secondary Diagnosis:	CHF (congestive heart failure)  Instructions for follow-up, activity and diet:	please f/u with your PCP/Cardiologist Dr Broderick León as an outpatient in 1-2 weeks, please call to schedule appointment. Your lasix was stopped prior to discharge to maintain appropriate blood pressure, please follow-up your PMD as to continuation. Principal Discharge DX:	Small bowel obstruction  Goal:	managed conservatively with NGT placement  Instructions for follow-up, activity and diet:	ACTIVITY: No heavy lifting or straining. Otherwise, you may return to your usual level of physical activity. If you are taking narcotic pain medication (such as Percocet) DO NOT drive a car, operate machinery or make important decisions.  DIET: Return to your usual diet.  NOTIFY YOUR SURGEON IF: You have any fever (over 100.4 F) or chills, persistent nausea/vomiting, persistent diarrhea, or if your pain is not controlled or no longer passing gas/having bowel movements.  FOLLOW-UP: Please follow up with your primary care physician and Cardiologist Dr Broderick León in one week regarding your hospitalization  Please f/u with Dr Senior as an outpatient, please call [phone] to schedule appointment  Please complete full course of antibiotics as prescribed.  Secondary Diagnosis:	Aspiration pneumonia of left lower lobe due to vomit  Instructions for follow-up, activity and diet:	please complete 10 day course Levaquin  Secondary Diagnosis:	CAD (coronary artery disease)  Instructions for follow-up, activity and diet:	please f/u with your PCP/Cardiologist Dr Broderick León as an outpatient in 1-2 weeks, please call to schedule appointment  Secondary Diagnosis:	CHF (congestive heart failure)  Instructions for follow-up, activity and diet:	please f/u with your PCP/Cardiologist Dr Broderick León as an outpatient in 1-2 weeks, please call to schedule appointment. Your lasix was stopped prior to discharge to maintain appropriate blood pressure, please follow-up your PMD as to continuation. Principal Discharge DX:	Small bowel obstruction  Goal:	managed conservatively with NGT placement  Instructions for follow-up, activity and diet:	ACTIVITY: No heavy lifting or straining. Otherwise, you may return to your usual level of physical activity. If you are taking narcotic pain medication (such as Percocet) DO NOT drive a car, operate machinery or make important decisions.  DIET: Return to your usual diet.  NOTIFY YOUR SURGEON IF: You have any fever (over 100.4 F) or chills, persistent nausea/vomiting, persistent diarrhea, or if your pain is not controlled or no longer passing gas/having bowel movements.  FOLLOW-UP: Please follow up with your primary care physician and Cardiologist Dr Broderick León in one week regarding your hospitalization  Please f/u with Dr Senior as an outpatient, please call [phone] to schedule appointment  Please complete full course of antibiotics as prescribed.  Secondary Diagnosis:	Aspiration pneumonia of left lower lobe due to vomit  Instructions for follow-up, activity and diet:	please complete 10 day course Levaquin  Secondary Diagnosis:	CAD (coronary artery disease)  Instructions for follow-up, activity and diet:	please f/u with your PCP/Cardiologist Dr Broderick León as an outpatient in 1-2 weeks, please call to schedule appointment  Secondary Diagnosis:	CHF (congestive heart failure)  Instructions for follow-up, activity and diet:	please f/u with your PCP/Cardiologist Dr Broderick León as an outpatient in 1-2 weeks, please call to schedule appointment. Your lasix was stopped prior to discharge to maintain appropriate blood pressure, please follow-up your PMD as to continuation.

## 2017-07-17 NOTE — DISCHARGE NOTE ADULT - SECONDARY DIAGNOSIS.
Aspiration pneumonia of left lower lobe due to vomit CAD (coronary artery disease) CHF (congestive heart failure)

## 2017-07-17 NOTE — PROGRESS NOTE ADULT - PROBLEM SELECTOR PLAN 2
finish a 5 day course of IV Zosyn, HHN with xopinex TID, maintain upright when feeding and good pulmonary toilet. Now finishing a 5 day course of vancomycin

## 2017-07-17 NOTE — DISCHARGE NOTE ADULT - PATIENT PORTAL LINK FT
“You can access the [facility] Patient Portal, offered by [facility], by registering with the following website: [email or web address]”

## 2017-07-17 NOTE — DISCHARGE NOTE ADULT - ADDITIONAL INSTRUCTIONS
Please f/u with Dr Senior as an outpatient, please call [phone] to schedule appointment  Please complete full course of antibiotics as prescribed.

## 2017-07-17 NOTE — PROGRESS NOTE ADULT - SUBJECTIVE AND OBJECTIVE BOX
A TEAM SURGERY     SUBJECTIVE: Pt seen and examined at bedside. No overnight events. Patient comfortable and in no-apparent distress. No nausea, vomiting, or diarrhea. Passing flatus and bowel movement.     Vital Signs Last 24 Hrs  T(C): 36.8 (17 Jul 2017 10:50), Max: 37.2 (16 Jul 2017 14:50)  T(F): 98.3 (17 Jul 2017 10:50), Max: 99 (16 Jul 2017 14:50)  HR: 84 (17 Jul 2017 10:50) (82 - 92)  BP: 103/59 (17 Jul 2017 10:50) (103/59 - 155/90)  BP(mean): --  RR: 18 (17 Jul 2017 10:50) (18 - 18)  SpO2: 98% (17 Jul 2017 10:50) (93% - 99%)    I&O's Detail    16 Jul 2017 07:01  -  17 Jul 2017 07:00  --------------------------------------------------------  IN:    IV PiggyBack: 450 mL    Oral Fluid: 100 mL  Total IN: 550 mL    OUT:    Indwelling Catheter - Urethral: 1100 mL  Total OUT: 1100 mL    Total NET: -550 mL      Physical Exam:  General Appearance: Appears well, NAD, Alert and disoriented at baseline  Neck: Supple  Chest: Equal expansion bilaterally, equal breath sounds  CV: Pulse regular presently  Abdomen: Soft, nontense, NTND  Extremities: Grossly symmetric    LABS:                                   12.4   9.12  )-----------( 285      ( 17 Jul 2017 06:35 )             38.3       07-17    141  |  102  |  10  ----------------------------<  92  4.4   |  27  |  0.72    Ca    8.7      17 Jul 2017 06:35  Phos  3.3     07-16  Mg     1.8     07-16

## 2017-07-17 NOTE — PROGRESS NOTE ADULT - SUBJECTIVE AND OBJECTIVE BOX
Patient is a 89y old  Female who presents with a chief complaint of weakness, vomited in ambulance (17 Jul 2017 10:03)        MEDICATIONS  (STANDING):  enoxaparin Injectable 40 milliGRAM(s) SubCutaneous daily  lisinopril 10 milliGRAM(s) Oral daily  metoprolol succinate ER 12.5 milliGRAM(s) Oral daily  simvastatin 20 milliGRAM(s) Oral at bedtime  furosemide    Tablet 40 milliGRAM(s) Oral daily  sodium chloride 0.9% lock flush 3 milliLiter(s) IV Push every 8 hours  pantoprazole    Tablet 40 milliGRAM(s) Oral before breakfast    MEDICATIONS  (PRN):  ALBUTerol/ipratropium for Nebulization 3 milliLiter(s) Nebulizer every 6 hours PRN Shortness of Breath and/or Wheezing            REVIEW OF SYSTEMS:  CONSTITUTIONAL: No fever, change in weight, or fatigue  HEAD: No headache, dizziness or recent trauma  EYES: No eye pain, visual disturbances, or discharge  ENT:  No difficulty hearing, tinnitus, vertigo, No sinus or throat pain  NECK: No pain or stiffness  BREASTS: No pain, masses, or nipple discharge  RESPIRATORY: No cough, wheezing, chills or hemoptysis, No shortness of breath at rest or exertional shortness of breath  CARDIOVASCULAR: No chest pain, palpitations, dizziness, CHF, arrhythmia, cardiomegaly or leg swelling  GASTROINTESTINAL: No abdominal or epigastric pain. resolving nausea,and vomiting no hematemesis, No diarrhea or constipation. No melena or hematochezia.  GENITOURINARY: No dysuria, frequency, hematuria, or incontinence  SKIN: No itching, burning, rashes, or lesions   LYMPH NODES: No history of enlarged glands  ENDOCRINE: No heat or cold intolerance, No hair loss. No osteoporosis or thyroid disease  MUSCULOSKELETAL: No joint pain or swelling, No muscle, back, or extremity pain  PSYCHIATRIC: No depression, anxiety, mood swings, or difficulty sleeping  HEME/LYMPH: No easy bruising, anticoagulants, bleeding disorder or bleeding gums  ALLERGY AND IMMUNOLOGIC: No hives or eczema  NEUROLOGICAL: confused at time    VITALS:   T(C): 37.1 (07-17-17 @ 17:34), Max: 37.1 (07-17-17 @ 17:34)  HR: 80 (07-17-17 @ 17:34) (80 - 92)  BP: 117/69 (07-17-17 @ 17:34) (103/59 - 155/90)  RR: 18 (07-17-17 @ 17:34) (18 - 18)  SpO2: 91% (07-17-17 @ 17:34) (91% - 99%)  Wt(kg): --    Physical Exam:  General Appearance: Appears well, NAD  Neck: Supple  Chest: Crackles at both lung bases. Equal expansion bilaterally Decreased breath sounds at the left base.  CV: Pulse regular presently. S1 S2 no S3 or S4. 3/6 HSN at the LLSB to the axilla  Abdomen: Soft, nontense, appropriate incisional tenderness, dressings clean and dry and intact  Extremities: Grossly symmetric    LABS:        CBC Full  -  ( 17 Jul 2017 06:35 )  WBC Count : 9.12 K/uL  Hemoglobin : 12.4 g/dL  Hematocrit : 38.3 %  Platelet Count - Automated : 285 K/uL  Mean Cell Volume : 90.1 fL  Mean Cell Hemoglobin : 29.2 pg  Mean Cell Hemoglobin Concentration : 32.4 %  Auto Neutrophil # : x  Auto Lymphocyte # : x  Auto Monocyte # : x  Auto Eosinophil # : x  Auto Basophil # : x  Auto Neutrophil % : x  Auto Lymphocyte % : x  Auto Monocyte % : x  Auto Eosinophil % : x  Auto Basophil % : x    07-17    141  |  102  |  10  ----------------------------<  92  4.4   |  27  |  0.72    Ca    8.7      17 Jul 2017 06:35  Phos  3.3     07-16  Mg     1.8     07-16

## 2017-07-17 NOTE — DISCHARGE NOTE ADULT - CONDITIONS AT DISCHARGE
Pt stable. Alert. Out of bed with assistance, utilizes a walker at home as per aide. Needs assistance ambulating for safety. Eating without nausea or vomiting. Incontinent of urine & stool. IAD noted, redness on buttocks, skin barrier ointment applied. Vitals stable, afebrile. Educated aide on safe environment. Alert. Out of bed with assistance, utilizes a walker at home as per aide. Needs assistance ambulating for safety. Eating without nausea or vomiting. Incontinent of urine & stool. IAD noted, redness on buttocks & bilateral groin, skin barrier ointment applied. Skin tear between 1st & 2nd toe on right foot. Vitals stable, afebrile. Educated aide on safe environment. Alert. Out of bed with assistance. Eating without nausea or vomiting. Incontinent of urine & stool. IAD noted, redness on buttocks & bilateral groin, skin barrier ointment applied. Skin tear between 1st & 2nd toe on right foot. Vitals stable, afebrile. Educated aide on safe environment.

## 2017-07-17 NOTE — PROGRESS NOTE ADULT - SUBJECTIVE AND OBJECTIVE BOX
INTERVAL HPI/OVERNIGHT EVENTS: Pt feels well.  Positive bowel function.    STATUS POST:      POST OPERATIVE DAY #:     MEDICATIONS  (STANDING):  enoxaparin Injectable 40 milliGRAM(s) SubCutaneous daily  lisinopril 10 milliGRAM(s) Oral daily  metoprolol succinate ER 12.5 milliGRAM(s) Oral daily  simvastatin 20 milliGRAM(s) Oral at bedtime  furosemide    Tablet 40 milliGRAM(s) Oral daily  vancomycin  IVPB   IV Intermittent   vancomycin  IVPB 750 milliGRAM(s) IV Intermittent every 24 hours  sodium chloride 0.9% lock flush 3 milliLiter(s) IV Push every 8 hours  pantoprazole    Tablet 40 milliGRAM(s) Oral before breakfast    MEDICATIONS  (PRN):  ALBUTerol/ipratropium for Nebulization 3 milliLiter(s) Nebulizer every 6 hours PRN Shortness of Breath and/or Wheezing      Vital Signs Last 24 Hrs  T(C): 36.8 (17 Jul 2017 10:50), Max: 37.2 (16 Jul 2017 14:50)  T(F): 98.3 (17 Jul 2017 10:50), Max: 99 (16 Jul 2017 14:50)  HR: 84 (17 Jul 2017 10:50) (82 - 92)  BP: 103/59 (17 Jul 2017 10:50) (103/59 - 155/90)  BP(mean): --  RR: 18 (17 Jul 2017 10:50) (18 - 18)  SpO2: 98% (17 Jul 2017 10:50) (93% - 99%)  I&O's Detail    16 Jul 2017 07:01  -  17 Jul 2017 07:00  --------------------------------------------------------  IN:    IV PiggyBack: 450 mL    Oral Fluid: 100 mL  Total IN: 550 mL    OUT:    Indwelling Catheter - Urethral: 1100 mL  Total OUT: 1100 mL    Total NET: -550 mL      17 Jul 2017 07:01  -  17 Jul 2017 11:36  --------------------------------------------------------  IN:    Oral Fluid: 120 mL  Total IN: 120 mL    OUT:  Total OUT: 0 mL    Total NET: 120 mL          REVIEW OF SYSTEMS:  CONSTITUTIONAL: No weakness, fevers or chills  EYES/ENT: No visual changes;  No vertigo or throat pain   NECK: No pain or stiffness  RESPIRATORY: No cough, wheezing, hemoptysis; No shortness of breath  CARDIOVASCULAR: No chest pain or palpitations  GASTROINTESTINAL: No abdominal or epigastric pain. No nausea, vomiting, or hematemesis; No diarrhea or constipation. No melena or hematochezia.  GENITOURINARY: No dysuria, frequency or hematuria  NEUROLOGICAL: No numbness or weakness  SKIN: No itching, burning, rashes, or lesions   All other review of systems is negative unless indicated above.    Physical Exam:  General: WN/WD NAD  Neurology: A&Ox3, nonfocal, CROSS x 4  Respiratory: CTA B/L  CV: RRR, S1S2, no murmurs, rubs or gallops  Abdominal: Soft, NT, ND +BS  Extremities: No edema, + peripheral pulses        LABS:                        12.4   9.12  )-----------( 285      ( 17 Jul 2017 06:35 )             38.3     07-17    141  |  102  |  10  ----------------------------<  92  4.4   |  27  |  0.72    Ca    8.7      17 Jul 2017 06:35  Phos  3.3     07-16  Mg     1.8     07-16            RADIOLOGY & ADDITIONAL STUDIES:

## 2017-07-17 NOTE — DISCHARGE NOTE ADULT - HOSPITAL COURSE
88 yo female with cardiac comorbidities and history of small bowel volvulus s/p SBR in 2005 and multiple episodes of SBO s/p ex lap and SBR in 2012 presenting now with one day of abdominal pain with nausea, bilious emesis and no bowel movements/flatus since start of symptoms.   Per son, who is bedside, patient is pleasantly demented at baseline, ambulates with walker, however over past two days has seemed more confused.   He states patient was otherwise in normal state of health until the symptoms started. Denies fever/chills, cough, diarrhea.    Patient's son states that patient may have an advanced directive and that he will attempt to acquire the document today during the day.    CT scan performed and showed 1.  High-grade small bowel obstruction with a single transition point in the right lower quadrant at the site of small bowel anastomosis.  No evidence of gastrointestinal perforation, abscess/drainable fluid   collection or secondary CT signs of bowel ischemia. 2.  Patchy consolidation dependently in both lower lobes and in the lingula with areas of poor enhancement compatible with multifocal pneumonia.  Aspiration should be excluded.  Small pleural effusions  bilaterally. 89 yo female with cardiac comorbidities and history of small bowel volvulus s/p SBR in 2005 and multiple episodes of SBO s/p ex lap and SBR in 2012 presenting now with one day of abdominal pain with nausea, bilious emesis and no bowel movements/flatus since start of symptoms.   Per son, who is bedside, patient is pleasantly demented at baseline, ambulates with walker, however over past two days has seemed more confused.  He states patient was otherwise in normal state of health until the symptoms started. Denies fever/chills, cough, diarrhea.    Patient's son states that patient may have an advanced directive and that he will attempt to acquire the document today during the day.    CT scan performed and showed 1.  High-grade small bowel obstruction with a single transition point in the right lower quadrant at the site of small bowel anastomosis.  No evidence of gastrointestinal perforation, abscess/drainable fluid   collection or secondary CT signs of bowel ischemia. 2.  Patchy consolidation dependently in both lower lobes and in the lingula with areas of poor enhancement compatible with multifocal pneumonia.  Aspiration should be excluded.  Small pleural effusions  bilaterally.    Pt admitted to surgical service and NGT placed.    Pt with productive cough, suspicious of pneumonia for which CXR performed 7/13 and showed increasing left pleural effusion and adjacent atelectasis. Persistent  subsegmental atelectasis in right lung base. Pt started on IV abx to treat presumed aspiration pneumonia. 7/14 f/u CXR showed shows increasing left pleural effusion. Persistent subsegmental atelectasis right lung base. Pt to be discharged with 7 day course Levaquin.    7/14 Small bowel series performed and showed evidence of passage of contrast from stomach into sigmoid and rectum.   This demonstrates resolution of small bowel obstruction. Post study pt + bowel function, NGT removed. Diet slowly advanced as tolerated.    Pt with significant past medical history for which Medicine and Cardiology consulted and followed pt while in hospital. Pt with acute on chronic diastolic heart failure exacerbation. Per Cardiology ideally would diurese the patient as she is high risk of going into worsening pulmonary edema and hypoxic respiratory failure however if there is a contraindication such as the presence of an SBO, then suggest that the standing fluids be decreased to allow for the patient to re-equilibrate the intravascular/extravascular volume. 7/15 on exam not significantly volume overloaded (no JVD, no edema, no rales) therefore no overt need for diuresis especially in setting of decreased IVF and overall improvement in SBO.     PT evaluated pt and rec d/c to rehab.    Per Attending pt stable for discharge. Pt to complete 7 day course Levaquin. Pt to follow up with Dr Senior as an outpatient, instructed to call to schedule appointment. Pt to follow up with her Cardiologist Dr Broderick León as an outpatient, instructed to call to schedule appointment. 88 yo female with cardiac comorbidities and history of small bowel volvulus s/p SBR in 2005 and multiple episodes of SBO s/p ex lap and SBR in 2012 presenting now with one day of abdominal pain with nausea, bilious emesis and no bowel movements/flatus since start of symptoms.   Per son, who is bedside, patient is pleasantly demented at baseline, ambulates with walker, however over past two days has seemed more confused.  He states patient was otherwise in normal state of health until the symptoms started. Denies fever/chills, cough, diarrhea.    Patient's son states that patient may have an advanced directive and that he will attempt to acquire the document today during the day.    CT scan performed and showed 1.  High-grade small bowel obstruction with a single transition point in the right lower quadrant at the site of small bowel anastomosis.  No evidence of gastrointestinal perforation, abscess/drainable fluid   collection or secondary CT signs of bowel ischemia. 2.  Patchy consolidation dependently in both lower lobes and in the lingula with areas of poor enhancement compatible with multifocal pneumonia.  Aspiration should be excluded.  Small pleural effusions  bilaterally.    Pt admitted to surgical service and NGT placed.    Pt with productive cough, suspicious of pneumonia for which CXR performed 7/13 and showed increasing left pleural effusion and adjacent atelectasis. Persistent  subsegmental atelectasis in right lung base. Pt started on IV abx to treat presumed aspiration pneumonia. 7/14 f/u CXR showed shows increasing left pleural effusion. Persistent subsegmental atelectasis right lung base. Pt to be discharged with 7 day course Levaquin.    7/14 Small bowel series performed and showed evidence of passage of contrast from stomach into sigmoid and rectum.   This demonstrates resolution of small bowel obstruction. Post study pt + bowel function, NGT removed. Diet slowly advanced as tolerated.    Pt with significant past medical history for which Medicine and Cardiology consulted and followed pt while in hospital. Pt with acute on chronic diastolic heart failure exacerbation. Per Cardiology ideally would diurese the patient as she is high risk of going into worsening pulmonary edema and hypoxic respiratory failure however if there is a contraindication such as the presence of an SBO, then suggest that the standing fluids be decreased to allow for the patient to re-equilibrate the intravascular/extravascular volume. 7/15 on exam not significantly volume overloaded (no JVD, no edema, no rales) therefore no overt need for diuresis especially in setting of decreased IVF and overall improvement in SBO.     PT evaluated pt and rec d/c to rehab.    Per Attending pt stable for discharge. Pt to complete 10 day course Levaquin. Pt to follow up with Dr Senior as an outpatient, instructed to call to schedule appointment. Pt to follow up with her Cardiologist Dr Broderick León as an outpatient, instructed to call to schedule appointment.

## 2017-07-17 NOTE — DISCHARGE NOTE ADULT - CARE PROVIDER_API CALL
Viral Senior (MD), Surgery  [address]  [place] [address]  Phone: [phone]  Fax: [phone]

## 2017-07-17 NOTE — DISCHARGE NOTE ADULT - NSTOBACCOHOTLINE_GEN_A_NCS
[facility] Smokers Quitline (906-IO-YNECP) [facility] Smokers Quitline (171-IR-CGHUO) [facility] Smokers Quitline (555-PG-IOIRH)

## 2017-07-17 NOTE — DISCHARGE NOTE ADULT - INSTRUCTIONS
Take medication as prescribed, don't miss medication dose unless instructed by your MD. Take medication as prescribed, don't miss medication dose unless instructed by your MD. Watch for signs of abdominal pain, nausea, vomiting, report such symptoms to the MD. Drink 6-8 glasses of fluids daily to promote hydration. Safe home environment. Perform good hygiene on the perineum area after urinating & BM so that skin won't continue to be moist. Follow up with primary MD Take medication as prescribed, don't miss medication dose unless instructed by your MD. Watch for signs of abdominal pain, nausea, vomiting, report such symptoms to the MD. eat small more frequent meals. Drink 6-8 glasses of fluids daily to promote hydration. Provide a safe home environment. Perform good hygiene on the perineum area after urinating & BM so that skin won't continue to be moist & cause skin irritation. Follow up with primary MD

## 2017-07-17 NOTE — DISCHARGE NOTE ADULT - MEDICATION SUMMARY - MEDICATIONS TO TAKE
I will START or STAY ON the medications listed below when I get home from the hospital:    aspirin 81 mg oral delayed release tablet  -- 1 tab(s) by mouth once a day  -- Indication: For Prevent ischemia    ramipril 2.5 mg oral capsule  -- 1 cap(s) by mouth once a day  -- Indication: For HTN    Zocor 20 mg oral tablet  -- 1 tab(s) by mouth once a day (at bedtime)  -- Indication: For HLD    Evista 60 mg oral tablet  -- 1 tab(s) by mouth once a day  -- Indication: For Osteoporosis    Toprol-XL  -- 12.5 milligram(s) by mouth once a day  -- Indication: For HTN    Lasix 40 mg oral tablet  -- 1 tab(s) by mouth once a day (in the morning)  -- Indication: For CHF (congestive heart failure)    tobramycin 0.3% ophthalmic solution  -- 1 drop(s) to each affected eye 4 times a day  -- Indication: For home med    dexamethasone/neomycin/polymyxin B 1 mg-3.5 mg-10,000 units/g ophthalmic ointment  -- 1 application to each affected eye 2 times a day  -- Indication: For home med    Aciphex 20 mg oral delayed release tablet  -- 1 tab(s) by mouth once a day  -- Indication: For GERD    levoFLOXacin 250 mg oral tablet  -- 1 tab(s) by mouth every 24 hours  -- Indication: For Antibiotic    Calcium 600+D  -- 1 tab(s) by mouth 2 times a day  -- Indication: For Supplement I will START or STAY ON the medications listed below when I get home from the hospital:    aspirin 81 mg oral delayed release tablet  -- 1 tab(s) by mouth once a day  -- Indication: For Prevent ischemia    ramipril 2.5 mg oral capsule  -- 1 cap(s) by mouth once a day  -- Indication: For HTN    Zocor 20 mg oral tablet  -- 1 tab(s) by mouth once a day (at bedtime)  -- Indication: For HLD    Evista 60 mg oral tablet  -- 1 tab(s) by mouth once a day  -- Indication: For Osteoporosis    Toprol-XL  -- 12.5 milligram(s) by mouth once a day  -- Indication: For HTN    tobramycin 0.3% ophthalmic solution  -- 1 drop(s) to each affected eye 4 times a day  -- Indication: For home med    dexamethasone/neomycin/polymyxin B 1 mg-3.5 mg-10,000 units/g ophthalmic ointment  -- 1 application to each affected eye 2 times a day  -- Indication: For home med    Aciphex 20 mg oral delayed release tablet  -- 1 tab(s) by mouth once a day  -- Indication: For GERD    levoFLOXacin 250 mg oral tablet  -- 1 tab(s) by mouth every 24 hours  -- Indication: For Antibiotic    Calcium 600+D  -- 1 tab(s) by mouth 2 times a day  -- Indication: For Supplement I will START or STAY ON the medications listed below when I get home from the hospital:    aspirin 81 mg oral delayed release tablet  -- 1 tab(s) by mouth once a day  -- Indication: For Prevent ischemia    ramipril 2.5 mg oral capsule  -- 1 cap(s) by mouth once a day  -- Indication: For HTN    Zocor 20 mg oral tablet  -- 1 tab(s) by mouth once a day (at bedtime)  -- Indication: For HLD    Evista 60 mg oral tablet  -- 1 tab(s) by mouth once a day  -- Indication: For Osteoporosis    Toprol-XL  -- 12.5 milligram(s) by mouth once a day  -- Indication: For HTN    tobramycin 0.3% ophthalmic solution  -- 1 drop(s) to each affected eye 4 times a day  -- Indication: For home med    dexamethasone/neomycin/polymyxin B 1 mg-3.5 mg-10,000 units/g ophthalmic ointment  -- 1 application to each affected eye 2 times a day  -- Indication: For home med    Aciphex 20 mg oral delayed release tablet  -- 1 tab(s) by mouth once a day  -- Indication: For GERD    levoFLOXacin 250 mg oral tablet  -- 1 tab(s) by mouth every 24 hours  -- Indication: For Home med

## 2017-07-17 NOTE — DISCHARGE NOTE ADULT - CARE PROVIDERS DIRECT ADDRESSES
[email or web address]

## 2017-07-17 NOTE — DISCHARGE NOTE ADULT - MEDICATION SUMMARY - MEDICATIONS TO STOP TAKING
I will STOP taking the medications listed below when I get home from the hospital:    guaiFENesin 100 mg/5 mL oral liquid  -- 5 milliliter(s) by mouth every 6 hours    potassium chloride 20 mEq oral tablet, extended release  -- 1 tab(s) by mouth once a day I will STOP taking the medications listed below when I get home from the hospital:    guaiFENesin 100 mg/5 mL oral liquid  -- 5 milliliter(s) by mouth every 6 hours    Lasix 40 mg oral tablet  -- 1 tab(s) by mouth once a day (in the morning)    potassium chloride 20 mEq oral tablet, extended release  -- 1 tab(s) by mouth once a day

## 2017-07-17 NOTE — PROGRESS NOTE ADULT - ASSESSMENT
Patient is a 89y old Female with SBO.  - Diet, Regular  - c/w vancomycin;       - pt. has penicillin allergy and will likely start cipro/flagyl today  - Continue Toprol XL and Lisinopril  - c/w pulmonary toileting  - d/c planning --> Rehab

## 2017-07-18 LAB
HCT VFR BLD CALC: 39.5 % — SIGNIFICANT CHANGE UP (ref 34.5–45)
HGB BLD-MCNC: 12.6 G/DL — SIGNIFICANT CHANGE UP (ref 11.5–15.5)
MCHC RBC-ENTMCNC: 28.6 PG — SIGNIFICANT CHANGE UP (ref 27–34)
MCHC RBC-ENTMCNC: 31.9 % — LOW (ref 32–36)
MCV RBC AUTO: 89.6 FL — SIGNIFICANT CHANGE UP (ref 80–100)
NRBC # FLD: 0 — SIGNIFICANT CHANGE UP
PLATELET # BLD AUTO: 318 K/UL — SIGNIFICANT CHANGE UP (ref 150–400)
PMV BLD: 10.4 FL — SIGNIFICANT CHANGE UP (ref 7–13)
RBC # BLD: 4.41 M/UL — SIGNIFICANT CHANGE UP (ref 3.8–5.2)
RBC # FLD: 14.9 % — HIGH (ref 10.3–14.5)
WBC # BLD: 10.12 K/UL — SIGNIFICANT CHANGE UP (ref 3.8–10.5)
WBC # FLD AUTO: 10.12 K/UL — SIGNIFICANT CHANGE UP (ref 3.8–10.5)

## 2017-07-18 RX ORDER — SODIUM CHLORIDE 9 MG/ML
500 INJECTION, SOLUTION INTRAVENOUS ONCE
Qty: 0 | Refills: 0 | Status: COMPLETED | OUTPATIENT
Start: 2017-07-18 | End: 2017-07-18

## 2017-07-18 RX ORDER — SODIUM CHLORIDE 9 MG/ML
250 INJECTION, SOLUTION INTRAVENOUS ONCE
Qty: 0 | Refills: 0 | Status: COMPLETED | OUTPATIENT
Start: 2017-07-18 | End: 2017-07-18

## 2017-07-18 RX ORDER — NYSTATIN CREAM 100000 [USP'U]/G
1 CREAM TOPICAL DAILY
Qty: 0 | Refills: 0 | Status: DISCONTINUED | OUTPATIENT
Start: 2017-07-18 | End: 2017-07-21

## 2017-07-18 RX ADMIN — SIMVASTATIN 20 MILLIGRAM(S): 20 TABLET, FILM COATED ORAL at 21:43

## 2017-07-18 RX ADMIN — SODIUM CHLORIDE 3 MILLILITER(S): 9 INJECTION INTRAMUSCULAR; INTRAVENOUS; SUBCUTANEOUS at 21:43

## 2017-07-18 RX ADMIN — ENOXAPARIN SODIUM 40 MILLIGRAM(S): 100 INJECTION SUBCUTANEOUS at 21:43

## 2017-07-18 RX ADMIN — Medication 3 MILLILITER(S): at 11:39

## 2017-07-18 RX ADMIN — SODIUM CHLORIDE 500 MILLILITER(S): 9 INJECTION, SOLUTION INTRAVENOUS at 14:30

## 2017-07-18 RX ADMIN — Medication 12.5 MILLIGRAM(S): at 05:20

## 2017-07-18 RX ADMIN — LISINOPRIL 10 MILLIGRAM(S): 2.5 TABLET ORAL at 05:20

## 2017-07-18 RX ADMIN — SODIUM CHLORIDE 3 MILLILITER(S): 9 INJECTION INTRAMUSCULAR; INTRAVENOUS; SUBCUTANEOUS at 14:22

## 2017-07-18 RX ADMIN — Medication 40 MILLIGRAM(S): at 05:20

## 2017-07-18 RX ADMIN — PANTOPRAZOLE SODIUM 40 MILLIGRAM(S): 20 TABLET, DELAYED RELEASE ORAL at 05:20

## 2017-07-18 RX ADMIN — SODIUM CHLORIDE 500 MILLILITER(S): 9 INJECTION, SOLUTION INTRAVENOUS at 17:58

## 2017-07-18 RX ADMIN — SODIUM CHLORIDE 3 MILLILITER(S): 9 INJECTION INTRAMUSCULAR; INTRAVENOUS; SUBCUTANEOUS at 05:19

## 2017-07-18 NOTE — PROGRESS NOTE ADULT - SUBJECTIVE AND OBJECTIVE BOX
Patient is a 89y old  Female who presents with a chief complaint of weakness, vomited in ambulance (17 Jul 2017 10:03) Receiving fluid for hypotension.  No recurrent SBO and volume depletion related to chronic lasix , which will be held. I hypotension persists, will next hold lisinopril.      MEDICATIONS  (STANDING):  enoxaparin Injectable 40 milliGRAM(s) SubCutaneous daily  lisinopril 10 milliGRAM(s) Oral daily  metoprolol succinate ER 12.5 milliGRAM(s) Oral daily  simvastatin 20 milliGRAM(s) Oral at bedtime  sodium chloride 0.9% lock flush 3 milliLiter(s) IV Push every 8 hours  pantoprazole    Tablet 40 milliGRAM(s) Oral before breakfast  levoFLOXacin  Tablet 250 milliGRAM(s) Oral every 24 hours    MEDICATIONS  (PRN):  ALBUTerol/ipratropium for Nebulization 3 milliLiter(s) Nebulizer every 6 hours PRN Shortness of Breath and/or Wheezing          REVIEW OF SYSTEMS:  CONSTITUTIONAL: No fever, change in weight, or fatigue  HEAD: No headache, dizziness or recent trauma  EYES: No eye pain, visual disturbances, or discharge  ENT:  No difficulty hearing, tinnitus, vertigo, No sinus or throat pain  NECK: No pain or stiffness  BREASTS: No pain, masses, or nipple discharge  RESPIRATORY: No cough, wheezing, chills or hemoptysis, No shortness of breath at rest or exertional shortness of breath  CARDIOVASCULAR: No chest pain, palpitations, dizziness, CHF, arrhythmia, cardiomegaly or leg swelling  GASTROINTESTINAL: No abdominal or epigastric pain. resolving nausea,and vomiting no hematemesis, No diarrhea or constipation. No melena or hematochezia.  GENITOURINARY: No dysuria, frequency, hematuria, or incontinence  SKIN: No itching, burning, rashes, or lesions   LYMPH NODES: No history of enlarged glands  ENDOCRINE: No heat or cold intolerance, No hair loss. No osteoporosis or thyroid disease  MUSCULOSKELETAL: No joint pain or swelling, No muscle, back, or extremity pain  PSYCHIATRIC: No depression, anxiety, mood swings, or difficulty sleeping  HEME/LYMPH: No easy bruising, anticoagulants, bleeding disorder or bleeding gums  ALLERGY AND IMMUNOLOGIC: No hives or eczema  NEUROLOGICAL: confused at time    Vital Signs Last 24 Hrs  T(C): 36.8 (18 Jul 2017 18:03), Max: 37.2 (17 Jul 2017 21:55)  T(F): 98.3 (18 Jul 2017 18:03), Max: 99 (17 Jul 2017 21:55)  HR: 89 (18 Jul 2017 18:03) (80 - 117)  BP: 82/56 (18 Jul 2017 17:35) (80/48 - 139/89)  BP(mean): --  RR: 17 (18 Jul 2017 18:03) (17 - 20)  SpO2: 94% (18 Jul 2017 18:03) (90% - 98%        Physical Exam:  General Appearance: Appears well, NAD  Neck: Supple  Chest: Crackles at both lung bases. Equal expansion bilaterally Decreased breath sounds at the left base.  CV: Pulse regular presently. S1 S2 no S3 or S4. 3/6 HSN at the LLSB to the axilla  Abdomen: Soft, nontense, appropriate incisional tenderness, dressings clean and dry and intact  Extremities: Grossly symmetric    LABS:      CBC Full  -  ( 18 Jul 2017 06:00 )  WBC Count : 10.12 K/uL  Hemoglobin : 12.6 g/dL  Hematocrit : 39.5 %  Platelet Count - Automated : 318 K/uL  Mean Cell Volume : 89.6 fL  Mean Cell Hemoglobin : 28.6 pg  Mean Cell Hemoglobin Concentration : 31.9 %  Auto Neutrophil # : x  Auto Lymphocyte # : x  Auto Monocyte # : x  Auto Eosinophil # : x  Auto Basophil # : x  Auto Neutrophil % : x  Auto Lymphocyte % : x  Auto Monocyte % : x  Auto Eosinophil % : x  Auto Basophil % : x    07-17    141  |  102  |  10  ----------------------------<  92  4.4   |  27  |  0.72    Ca    8.7      17 Jul 2017 06:35

## 2017-07-18 NOTE — PROGRESS NOTE ADULT - SUBJECTIVE AND OBJECTIVE BOX
INTERVAL HPI/OVERNIGHT EVENTS:  Tolerating diet, no acute events    MEDICATIONS  (STANDING):  enoxaparin Injectable 40 milliGRAM(s) SubCutaneous daily  lisinopril 10 milliGRAM(s) Oral daily  metoprolol succinate ER 12.5 milliGRAM(s) Oral daily  simvastatin 20 milliGRAM(s) Oral at bedtime  furosemide    Tablet 40 milliGRAM(s) Oral daily  sodium chloride 0.9% lock flush 3 milliLiter(s) IV Push every 8 hours  pantoprazole    Tablet 40 milliGRAM(s) Oral before breakfast  levoFLOXacin  Tablet 250 milliGRAM(s) Oral every 24 hours    MEDICATIONS  (PRN):  ALBUTerol/ipratropium for Nebulization 3 milliLiter(s) Nebulizer every 6 hours PRN Shortness of Breath and/or Wheezing      Vital Signs Last 24 Hrs  T(C): 36.9 (18 Jul 2017 05:18), Max: 37.2 (17 Jul 2017 21:55)  T(F): 98.4 (18 Jul 2017 05:18), Max: 99 (17 Jul 2017 21:55)  HR: 88 (18 Jul 2017 05:18) (80 - 104)  BP: 120/70 (18 Jul 2017 05:18) (103/59 - 139/89)  BP(mean): --  RR: 17 (18 Jul 2017 05:18) (17 - 18)  SpO2: 93% (18 Jul 2017 05:18) (91% - 98%)    PHYSICAL EXAM:      Constitutional: NAD    Gastrointestinal: Abd soft, NT, ND        I&O's Detail    17 Jul 2017 07:01  -  18 Jul 2017 07:00  --------------------------------------------------------  IN:    Oral Fluid: 700 mL  Total IN: 700 mL    OUT:  Total OUT: 0 mL    Total NET: 700 mL          LABS:                        12.6   10.12 )-----------( 318      ( 18 Jul 2017 06:00 )             39.5     07-17    141  |  102  |  10  ----------------------------<  92  4.4   |  27  |  0.72    Ca    8.7      17 Jul 2017 06:35            RADIOLOGY & ADDITIONAL STUDIES:

## 2017-07-18 NOTE — PROGRESS NOTE ADULT - PROBLEM SELECTOR PLAN 5
Observation and 1 to 1 aide to prevent patient from causing self harm.

## 2017-07-18 NOTE — PROGRESS NOTE ADULT - SUBJECTIVE AND OBJECTIVE BOX
SUBJECTIVE: Pt seen and examined at bedside. No overnight events. Patient comfortable and in no-apparent distress. No nausea, vomiting, or diarrhea.   - Passing gas  - Passing stool   - Pain controlled     Pain: [ ] YES [x ] NO  Pain Control Adequate: [x ] YES [ ] NO  SOB: [x ]YES [ ] NO SOB is mild improving and is due to pneumonia  Chest Discomfort: [ ] YES [x ] NO    Nausea: [ ] YES [x ] NO           Vomiting: [ ] YES [ x] NO  Flatus: [ x] YES [ ] NO             Bowel Movement: [x ] YES [ ] NO     Void: [x ]YES [ ]No    Vital Signs Last 24 Hrs  T(C): 36.6 (18 Jul 2017 10:01), Max: 37.2 (17 Jul 2017 21:55)  T(F): 97.8 (18 Jul 2017 10:01), Max: 99 (17 Jul 2017 21:55)  HR: 94 (18 Jul 2017 10:01) (80 - 104)  BP: 112/91 (18 Jul 2017 10:01) (112/91 - 139/89)  BP(mean): --  RR: 18 (18 Jul 2017 10:01) (17 - 18)  SpO2: 92% (18 Jul 2017 10:01) (91% - 98%)    Physical Exam:  General Appearance: Appears well, NAD  Neck: Supple  Chest: Equal expansion bilaterally, equal breath sounds  CV: Pulse regular presently  Abdomen: Soft, nontense, non distended  Extremities: Grossly symmetric    LABS:                        12.6   10.12 )-----------( 318      ( 18 Jul 2017 06:00 )             39.5     07-17    141  |  102  |  10  ----------------------------<  92  4.4   |  27  |  0.72    Ca    8.7      17 Jul 2017 06:35            INs and OUTs:    07-17-17 @ 07:01  -  07-18-17 @ 07:00  --------------------------------------------------------  IN: 700 mL / OUT: 0 mL / NET: 700 mL SUBJECTIVE: Pt seen and examined at bedside. No overnight events. Patient comfortable and in no-apparent distress. No nausea, vomiting, or diarrhea. She is AO x 1.  - Passing gas  - Passing stool   - Pain controlled     Pain: [ ] YES [x ] NO  Pain Control Adequate: [x ] YES [ ] NO  SOB: [x ]YES [ ] NO SOB is mild improving and is due to pneumonia  Chest Discomfort: [ ] YES [x ] NO    Nausea: [ ] YES [x ] NO           Vomiting: [ ] YES [ x] NO  Flatus: [ x] YES [ ] NO             Bowel Movement: [x ] YES [ ] NO     Void: [x ]YES [ ]No    Vital Signs Last 24 Hrs  T(C): 36.6 (18 Jul 2017 10:01), Max: 37.2 (17 Jul 2017 21:55)  T(F): 97.8 (18 Jul 2017 10:01), Max: 99 (17 Jul 2017 21:55)  HR: 94 (18 Jul 2017 10:01) (80 - 104)  BP: 112/91 (18 Jul 2017 10:01) (112/91 - 139/89)  BP(mean): --  RR: 18 (18 Jul 2017 10:01) (17 - 18)  SpO2: 92% (18 Jul 2017 10:01) (91% - 98%)    Physical Exam:  General Appearance: Appears well, NAD  Neck: Supple  Chest: Equal expansion bilaterally, equal breath sounds  CV: Pulse regular presently  Abdomen: Soft, nontense, non distended  Extremities: Grossly symmetric    LABS:                        12.6   10.12 )-----------( 318      ( 18 Jul 2017 06:00 )             39.5     07-17    141  |  102  |  10  ----------------------------<  92  4.4   |  27  |  0.72    Ca    8.7      17 Jul 2017 06:35            INs and OUTs:    07-17-17 @ 07:01  -  07-18-17 @ 07:00  --------------------------------------------------------  IN: 700 mL / OUT: 0 mL / NET: 700 mL

## 2017-07-18 NOTE — PROGRESS NOTE ADULT - PROBLEM SELECTOR PLAN 7
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

## 2017-07-18 NOTE — PROGRESS NOTE ADULT - PROBLEM SELECTOR PLAN 6
No wheezing to date, but has occurred in the past after SBO with aspiration. Sometime when fluid overloaded she develops "cardiac" asthma. Not a problem at present.
No wheezing to date, but has occurred in the past after SBO with aspiration. Sometime when fluid overloaded she develops "cardiac" asthma. Not a problem at present.
no signs of asthma at this time continue to observe
no signs of asthma at this time continue to observe
No wheezing to date, but has occurred in the past after SBO with aspiration. Sometime when fluid overloaded she develops "cardiac" asthma.Not a problem at present.

## 2017-07-18 NOTE — PROGRESS NOTE ADULT - ASSESSMENT
Patient is a 89y old Female s/p  for other specified intestinal obstruction.  Plan:  - Discharge  - Continue Antibiotic for pneumonia    . Patient is a 89y old Female s/p  for other specified intestinal obstruction.  Plan:  - Discharge  - Continue Antibiotic for pneumonia ( 10 days of Levaquin)    .

## 2017-07-18 NOTE — PROGRESS NOTE ADULT - PROBLEM SELECTOR PROBLEM 6
Asthma with acute exacerbation, unspecified asthma severity

## 2017-07-19 DIAGNOSIS — I50.9 HEART FAILURE, UNSPECIFIED: ICD-10-CM

## 2017-07-19 DIAGNOSIS — I95.9 HYPOTENSION, UNSPECIFIED: ICD-10-CM

## 2017-07-19 LAB
BUN SERPL-MCNC: 16 MG/DL — SIGNIFICANT CHANGE UP (ref 7–23)
CALCIUM SERPL-MCNC: 8.9 MG/DL — SIGNIFICANT CHANGE UP (ref 8.4–10.5)
CHLORIDE SERPL-SCNC: 101 MMOL/L — SIGNIFICANT CHANGE UP (ref 98–107)
CO2 SERPL-SCNC: 27 MMOL/L — SIGNIFICANT CHANGE UP (ref 22–31)
CREAT SERPL-MCNC: 1.01 MG/DL — SIGNIFICANT CHANGE UP (ref 0.5–1.3)
GLUCOSE SERPL-MCNC: 112 MG/DL — HIGH (ref 70–99)
HCT VFR BLD CALC: 38.6 % — SIGNIFICANT CHANGE UP (ref 34.5–45)
HGB BLD-MCNC: 12.7 G/DL — SIGNIFICANT CHANGE UP (ref 11.5–15.5)
MCHC RBC-ENTMCNC: 30.1 PG — SIGNIFICANT CHANGE UP (ref 27–34)
MCHC RBC-ENTMCNC: 32.9 % — SIGNIFICANT CHANGE UP (ref 32–36)
MCV RBC AUTO: 91.5 FL — SIGNIFICANT CHANGE UP (ref 80–100)
NRBC # FLD: 0 — SIGNIFICANT CHANGE UP
PLATELET # BLD AUTO: 307 K/UL — SIGNIFICANT CHANGE UP (ref 150–400)
PMV BLD: 10.3 FL — SIGNIFICANT CHANGE UP (ref 7–13)
POTASSIUM SERPL-MCNC: 4.2 MMOL/L — SIGNIFICANT CHANGE UP (ref 3.5–5.3)
POTASSIUM SERPL-SCNC: 4.2 MMOL/L — SIGNIFICANT CHANGE UP (ref 3.5–5.3)
RBC # BLD: 4.22 M/UL — SIGNIFICANT CHANGE UP (ref 3.8–5.2)
RBC # FLD: 14.8 % — HIGH (ref 10.3–14.5)
SODIUM SERPL-SCNC: 141 MMOL/L — SIGNIFICANT CHANGE UP (ref 135–145)
WBC # BLD: 8.39 K/UL — SIGNIFICANT CHANGE UP (ref 3.8–10.5)
WBC # FLD AUTO: 8.39 K/UL — SIGNIFICANT CHANGE UP (ref 3.8–10.5)

## 2017-07-19 RX ADMIN — LISINOPRIL 10 MILLIGRAM(S): 2.5 TABLET ORAL at 10:44

## 2017-07-19 RX ADMIN — SODIUM CHLORIDE 3 MILLILITER(S): 9 INJECTION INTRAMUSCULAR; INTRAVENOUS; SUBCUTANEOUS at 21:44

## 2017-07-19 RX ADMIN — ENOXAPARIN SODIUM 40 MILLIGRAM(S): 100 INJECTION SUBCUTANEOUS at 21:43

## 2017-07-19 RX ADMIN — PANTOPRAZOLE SODIUM 40 MILLIGRAM(S): 20 TABLET, DELAYED RELEASE ORAL at 10:44

## 2017-07-19 RX ADMIN — SODIUM CHLORIDE 3 MILLILITER(S): 9 INJECTION INTRAMUSCULAR; INTRAVENOUS; SUBCUTANEOUS at 06:55

## 2017-07-19 RX ADMIN — Medication 12.5 MILLIGRAM(S): at 21:44

## 2017-07-19 RX ADMIN — NYSTATIN CREAM 1 APPLICATION(S): 100000 CREAM TOPICAL at 12:40

## 2017-07-19 RX ADMIN — SODIUM CHLORIDE 3 MILLILITER(S): 9 INJECTION INTRAMUSCULAR; INTRAVENOUS; SUBCUTANEOUS at 14:05

## 2017-07-19 RX ADMIN — SIMVASTATIN 20 MILLIGRAM(S): 20 TABLET, FILM COATED ORAL at 21:43

## 2017-07-19 NOTE — PROGRESS NOTE ADULT - SUBJECTIVE AND OBJECTIVE BOX
INTERVAL HPI/OVERNIGHT EVENTS: Tolerating diet, (+) BM (+) Flatus. Pt had three episodes of hypotension last night. Int med withheld lasix.     MEDICATIONS  (STANDING):  enoxaparin Injectable 40 milliGRAM(s) SubCutaneous daily  lisinopril 10 milliGRAM(s) Oral daily  metoprolol succinate ER 12.5 milliGRAM(s) Oral daily  simvastatin 20 milliGRAM(s) Oral at bedtime  sodium chloride 0.9% lock flush 3 milliLiter(s) IV Push every 8 hours  pantoprazole    Tablet 40 milliGRAM(s) Oral before breakfast  levoFLOXacin  Tablet 250 milliGRAM(s) Oral every 24 hours  nystatin Powder 1 Application(s) Topical daily    MEDICATIONS  (PRN):  ALBUTerol/ipratropium for Nebulization 3 milliLiter(s) Nebulizer every 6 hours PRN Shortness of Breath and/or Wheezing      Vital Signs Last 24 Hrs  T(C): 36.6 (19 Jul 2017 09:50), Max: 37 (18 Jul 2017 17:35)  T(F): 97.8 (19 Jul 2017 09:50), Max: 98.6 (18 Jul 2017 17:35)  HR: 98 (19 Jul 2017 09:50) (80 - 117)  BP: 147/68 (19 Jul 2017 09:50) (80/48 - 147/68)  BP(mean): --  RR: 18 (19 Jul 2017 09:50) (17 - 20)  SpO2: 95% (19 Jul 2017 09:50) (90% - 98%)    PHYSICAL EXAM:      Constitutional: NAD    Gastrointestinal: Abd soft NT, ND        I&O's Detail      LABS:                        12.7   8.39  )-----------( 307      ( 19 Jul 2017 10:35 )             38.6     07-19    141  |  101  |  16  ----------------------------<  112<H>  4.2   |  27  |  1.01    Ca    8.9      19 Jul 2017 10:35            RADIOLOGY & ADDITIONAL STUDIES:

## 2017-07-19 NOTE — PROGRESS NOTE ADULT - SUBJECTIVE AND OBJECTIVE BOX
89 F with recurrent SBO, HTN, HLD, GERD, CAD, CHF admitted for SBO for 6 days.    Vital Signs Last 24 Hrs  T(C): 36.6 (19 Jul 2017 09:50), Max: 37 (18 Jul 2017 17:35)  T(F): 97.8 (19 Jul 2017 09:50), Max: 98.6 (18 Jul 2017 17:35)  HR: 98 (19 Jul 2017 09:50) (80 - 117)  BP: 147/68 (19 Jul 2017 09:50) (80/48 - 147/68)  BP(mean): --  RR: 18 (19 Jul 2017 09:50) (17 - 20)  SpO2: 95% (19 Jul 2017 09:50) (90% - 98%)    SUBJECTIVE: Pt seen this morning, no over night event, no acute complaint; passing gas, having BM, voiding (incontinent). Patient was asleep but aroused with some effort.      PHYSICAL EXAM:  Constitutional: Patient well nourish, well developed, resting in bed with no acute distress  Respiratory:  Lungs CTA, B/L, no rales , no wheezing, no rhonchi.  Cardiovascular:  S1, S2, RRR  Gastrointestinal: Abdomen soft, non distended, non tenderness  Extremities:  No edema, no calf tenderrness,  Neurological: AxAxOx1    I&O's Summary    I&O's Detail    MEDICATIONS  (STANDING):  enoxaparin Injectable 40 milliGRAM(s) SubCutaneous daily  lisinopril 10 milliGRAM(s) Oral daily  metoprolol succinate ER 12.5 milliGRAM(s) Oral daily  simvastatin 20 milliGRAM(s) Oral at bedtime  sodium chloride 0.9% lock flush 3 milliLiter(s) IV Push every 8 hours  pantoprazole    Tablet 40 milliGRAM(s) Oral before breakfast  levoFLOXacin  Tablet 250 milliGRAM(s) Oral every 24 hours  nystatin Powder 1 Application(s) Topical daily    MEDICATIONS  (PRN):  ALBUTerol/ipratropium for Nebulization 3 milliLiter(s) Nebulizer every 6 hours PRN Shortness of Breath and/or Wheezing      LABS:                        12.7   8.39  )-----------( 307      ( 19 Jul 2017 10:35 )             38.6     07-19    141  |  101  |  16  ----------------------------<  112<H>  4.2   |  27  |  1.01    Ca    8.9      19 Jul 2017 10:35

## 2017-07-19 NOTE — PROGRESS NOTE ADULT - ASSESSMENT
89 F with recurrent SBO, HTN, HLD, GERD, CAD, CHF admitted for SBO for 6 days, was going to be discharged yesterday but withheld due to hypotension (lowest S.BP mid 80s), patient is now AVSS    Plan:  ·	Given total 1L bolus  ·	Encourage oral intake  ·	D/C today

## 2017-07-19 NOTE — PROGRESS NOTE ADULT - SUBJECTIVE AND OBJECTIVE BOX
Patient is a 89y old  Female who presents with a chief complaint of weakness, vomited in ambulance (17 Jul 2017 10:03)        MEDICATIONS  (STANDING):  enoxaparin Injectable 40 milliGRAM(s) SubCutaneous daily  lisinopril 10 milliGRAM(s) Oral daily  metoprolol succinate ER 12.5 milliGRAM(s) Oral daily  simvastatin 20 milliGRAM(s) Oral at bedtime  sodium chloride 0.9% lock flush 3 milliLiter(s) IV Push every 8 hours  pantoprazole    Tablet 40 milliGRAM(s) Oral before breakfast  levoFLOXacin  Tablet 250 milliGRAM(s) Oral every 24 hours  nystatin Powder 1 Application(s) Topical daily    MEDICATIONS  (PRN):  ALBUTerol/ipratropium for Nebulization 3 milliLiter(s) Nebulizer every 6 hours PRN Shortness of Breath and/or Wheezing      REVIEW OF SYSTEMS:  CONSTITUTIONAL: No fever, change in weight, or fatigue  HEAD: No headache, dizziness or recent trauma  EYES: No eye pain, visual disturbances, or discharge  ENT:  No difficulty hearing, tinnitus, vertigo, No sinus or throat pain  NECK: No pain or stiffness  BREASTS: No pain, masses, or nipple discharge  RESPIRATORY: No cough, wheezing, chills or hemoptysis, No shortness of breath at rest or exertional shortness of breath  CARDIOVASCULAR: No chest pain, palpitations, dizziness, CHF, arrhythmia, cardiomegaly or leg swelling  GASTROINTESTINAL: No abdominal or epigastric pain. resolving nausea,and vomiting no hematemesis, No diarrhea or constipation. No melena or hematochezia.  GENITOURINARY: No dysuria, frequency, hematuria, or incontinence  SKIN: No itching, burning, rashes, or lesions   LYMPH NODES: No history of enlarged glands  ENDOCRINE: No heat or cold intolerance, No hair loss. No osteoporosis or thyroid disease  MUSCULOSKELETAL: No joint pain or swelling, No muscle, back, or extremity pain  PSYCHIATRIC: No depression, anxiety, mood swings, or difficulty sleeping  HEME/LYMPH: No easy bruising, anticoagulants, bleeding disorder or bleeding gums  ALLERGY AND IMMUNOLOGIC: No hives or eczema  NEUROLOGICAL: confused at time      VITALS:   T(C): 36.6 (07-19-17 @ 09:50), Max: 37 (07-18-17 @ 17:35)  HR: 98 (07-19-17 @ 09:50) (80 - 117)  BP: 147/68 (07-19-17 @ 09:50) (82/56 - 147/68)  RR: 18 (07-19-17 @ 09:50) (17 - 20)  SpO2: 95% (07-19-17 @ 09:50) (90% - 98%)  Wt(kg): --        Physical Exam:  General Appearance: Appears well, NAD  Neck: Supple  Chest: Crackles at both lung bases. Equal expansion bilaterally Decreased breath sounds at the left base.  CV: Pulse regular presently. S1 S2 no S3 or S4. 3/6 HSN at the LLSB to the axilla  Abdomen: Soft, nontense, appropriate incisional tenderness, dressings clean and dry and intact  Extremities: Grossly symmetric      LABS:        CBC Full  -  ( 19 Jul 2017 10:35 )  WBC Count : 8.39 K/uL  Hemoglobin : 12.7 g/dL  Hematocrit : 38.6 %  Platelet Count - Automated : 307 K/uL  Mean Cell Volume : 91.5 fL  Mean Cell Hemoglobin : 30.1 pg  Mean Cell Hemoglobin Concentration : 32.9 %  Auto Neutrophil # : x  Auto Lymphocyte # : x  Auto Monocyte # : x  Auto Eosinophil # : x  Auto Basophil # : x  Auto Neutrophil % : x  Auto Lymphocyte % : x  Auto Monocyte % : x  Auto Eosinophil % : x  Auto Basophil % : x    07-19    141  |  101  |  16  ----------------------------<  112<H>  4.2   |  27  |  1.01    Ca    8.9      19 Jul 2017 10:35            CAPILLARY BLOOD GLUCOSE          RADIOLOGY & ADDITIONAL TESTS:      Consultant(s):    Care Discussed with Consultants/Other Providers [ ] YES  [ ] NO

## 2017-07-19 NOTE — PROGRESS NOTE ADULT - PROBLEM SELECTOR PLAN 1
- Cont diet  - OOB & Amb  - DC home - Pt seen and examined with Dr Osuna  - Cont diet  - OOB & Amb  - DC on 20 mg Lasix PO QD  - Follow up with PCP to monitor CH  - DC home

## 2017-07-20 LAB
BUN SERPL-MCNC: 17 MG/DL — SIGNIFICANT CHANGE UP (ref 7–23)
BUN SERPL-MCNC: 18 MG/DL — SIGNIFICANT CHANGE UP (ref 7–23)
CALCIUM SERPL-MCNC: 8.9 MG/DL — SIGNIFICANT CHANGE UP (ref 8.4–10.5)
CALCIUM SERPL-MCNC: 9 MG/DL — SIGNIFICANT CHANGE UP (ref 8.4–10.5)
CHLORIDE SERPL-SCNC: 101 MMOL/L — SIGNIFICANT CHANGE UP (ref 98–107)
CHLORIDE SERPL-SCNC: 104 MMOL/L — SIGNIFICANT CHANGE UP (ref 98–107)
CK MB BLD-MCNC: 2.84 NG/ML — SIGNIFICANT CHANGE UP (ref 1–4.7)
CK MB BLD-MCNC: SIGNIFICANT CHANGE UP (ref 0–2.5)
CK SERPL-CCNC: 37 U/L — SIGNIFICANT CHANGE UP (ref 25–170)
CO2 SERPL-SCNC: 23 MMOL/L — SIGNIFICANT CHANGE UP (ref 22–31)
CO2 SERPL-SCNC: 26 MMOL/L — SIGNIFICANT CHANGE UP (ref 22–31)
CREAT SERPL-MCNC: 0.89 MG/DL — SIGNIFICANT CHANGE UP (ref 0.5–1.3)
CREAT SERPL-MCNC: 0.89 MG/DL — SIGNIFICANT CHANGE UP (ref 0.5–1.3)
GLUCOSE SERPL-MCNC: 92 MG/DL — SIGNIFICANT CHANGE UP (ref 70–99)
GLUCOSE SERPL-MCNC: 95 MG/DL — SIGNIFICANT CHANGE UP (ref 70–99)
HCT VFR BLD CALC: 38.3 % — SIGNIFICANT CHANGE UP (ref 34.5–45)
HCT VFR BLD CALC: 40.3 % — SIGNIFICANT CHANGE UP (ref 34.5–45)
HGB BLD-MCNC: 12.5 G/DL — SIGNIFICANT CHANGE UP (ref 11.5–15.5)
HGB BLD-MCNC: 12.9 G/DL — SIGNIFICANT CHANGE UP (ref 11.5–15.5)
MAGNESIUM SERPL-MCNC: 1.8 MG/DL — SIGNIFICANT CHANGE UP (ref 1.6–2.6)
MAGNESIUM SERPL-MCNC: 1.8 MG/DL — SIGNIFICANT CHANGE UP (ref 1.6–2.6)
MCHC RBC-ENTMCNC: 29 PG — SIGNIFICANT CHANGE UP (ref 27–34)
MCHC RBC-ENTMCNC: 29.1 PG — SIGNIFICANT CHANGE UP (ref 27–34)
MCHC RBC-ENTMCNC: 32 % — SIGNIFICANT CHANGE UP (ref 32–36)
MCHC RBC-ENTMCNC: 32.6 % — SIGNIFICANT CHANGE UP (ref 32–36)
MCV RBC AUTO: 88.9 FL — SIGNIFICANT CHANGE UP (ref 80–100)
MCV RBC AUTO: 91 FL — SIGNIFICANT CHANGE UP (ref 80–100)
NRBC # FLD: 0 — SIGNIFICANT CHANGE UP
NRBC # FLD: 0 — SIGNIFICANT CHANGE UP
PHOSPHATE SERPL-MCNC: 3.3 MG/DL — SIGNIFICANT CHANGE UP (ref 2.5–4.5)
PHOSPHATE SERPL-MCNC: 3.4 MG/DL — SIGNIFICANT CHANGE UP (ref 2.5–4.5)
PLATELET # BLD AUTO: 300 K/UL — SIGNIFICANT CHANGE UP (ref 150–400)
PLATELET # BLD AUTO: 349 K/UL — SIGNIFICANT CHANGE UP (ref 150–400)
PMV BLD: 10.1 FL — SIGNIFICANT CHANGE UP (ref 7–13)
PMV BLD: 10.4 FL — SIGNIFICANT CHANGE UP (ref 7–13)
POTASSIUM SERPL-MCNC: 4 MMOL/L — SIGNIFICANT CHANGE UP (ref 3.5–5.3)
POTASSIUM SERPL-MCNC: 4.1 MMOL/L — SIGNIFICANT CHANGE UP (ref 3.5–5.3)
POTASSIUM SERPL-SCNC: 4 MMOL/L — SIGNIFICANT CHANGE UP (ref 3.5–5.3)
POTASSIUM SERPL-SCNC: 4.1 MMOL/L — SIGNIFICANT CHANGE UP (ref 3.5–5.3)
RBC # BLD: 4.31 M/UL — SIGNIFICANT CHANGE UP (ref 3.8–5.2)
RBC # BLD: 4.43 M/UL — SIGNIFICANT CHANGE UP (ref 3.8–5.2)
RBC # FLD: 14.6 % — HIGH (ref 10.3–14.5)
RBC # FLD: 14.7 % — HIGH (ref 10.3–14.5)
SODIUM SERPL-SCNC: 140 MMOL/L — SIGNIFICANT CHANGE UP (ref 135–145)
SODIUM SERPL-SCNC: 142 MMOL/L — SIGNIFICANT CHANGE UP (ref 135–145)
TROPONIN T SERPL-MCNC: < 0.06 NG/ML — SIGNIFICANT CHANGE UP (ref 0–0.06)
WBC # BLD: 7.42 K/UL — SIGNIFICANT CHANGE UP (ref 3.8–10.5)
WBC # BLD: 9.24 K/UL — SIGNIFICANT CHANGE UP (ref 3.8–10.5)
WBC # FLD AUTO: 7.42 K/UL — SIGNIFICANT CHANGE UP (ref 3.8–10.5)
WBC # FLD AUTO: 9.24 K/UL — SIGNIFICANT CHANGE UP (ref 3.8–10.5)

## 2017-07-20 PROCEDURE — 93010 ELECTROCARDIOGRAM REPORT: CPT

## 2017-07-20 RX ORDER — SODIUM CHLORIDE 9 MG/ML
500 INJECTION INTRAMUSCULAR; INTRAVENOUS; SUBCUTANEOUS ONCE
Qty: 0 | Refills: 0 | Status: COMPLETED | OUTPATIENT
Start: 2017-07-20 | End: 2017-07-20

## 2017-07-20 RX ORDER — MAGNESIUM SULFATE 500 MG/ML
2 VIAL (ML) INJECTION ONCE
Qty: 0 | Refills: 0 | Status: COMPLETED | OUTPATIENT
Start: 2017-07-20 | End: 2017-07-20

## 2017-07-20 RX ADMIN — SIMVASTATIN 20 MILLIGRAM(S): 20 TABLET, FILM COATED ORAL at 22:09

## 2017-07-20 RX ADMIN — Medication 50 GRAM(S): at 13:08

## 2017-07-20 RX ADMIN — PANTOPRAZOLE SODIUM 40 MILLIGRAM(S): 20 TABLET, DELAYED RELEASE ORAL at 06:24

## 2017-07-20 RX ADMIN — SODIUM CHLORIDE 3 MILLILITER(S): 9 INJECTION INTRAMUSCULAR; INTRAVENOUS; SUBCUTANEOUS at 21:37

## 2017-07-20 RX ADMIN — SODIUM CHLORIDE 500 MILLILITER(S): 9 INJECTION INTRAMUSCULAR; INTRAVENOUS; SUBCUTANEOUS at 11:30

## 2017-07-20 RX ADMIN — LISINOPRIL 10 MILLIGRAM(S): 2.5 TABLET ORAL at 06:24

## 2017-07-20 RX ADMIN — Medication 12.5 MILLIGRAM(S): at 22:09

## 2017-07-20 RX ADMIN — SODIUM CHLORIDE 3 MILLILITER(S): 9 INJECTION INTRAMUSCULAR; INTRAVENOUS; SUBCUTANEOUS at 13:08

## 2017-07-20 RX ADMIN — SODIUM CHLORIDE 3 MILLILITER(S): 9 INJECTION INTRAMUSCULAR; INTRAVENOUS; SUBCUTANEOUS at 06:25

## 2017-07-20 RX ADMIN — NYSTATIN CREAM 1 APPLICATION(S): 100000 CREAM TOPICAL at 12:44

## 2017-07-20 RX ADMIN — ENOXAPARIN SODIUM 40 MILLIGRAM(S): 100 INJECTION SUBCUTANEOUS at 22:09

## 2017-07-20 NOTE — CHART NOTE - NSCHARTNOTEFT_GEN_A_CORE
Phone conversation had with Dr. Dylon Alaniz (Internal Medicine).    Called to discuss patient's intermittent hypotensive episodes. Dr. Alaniz recommended discontinuation of lisinopril and lasix (already stopped). Continuation of Toprol. Monitor BP with these changes. Bolus as needed and encourage oral intake.

## 2017-07-20 NOTE — PROGRESS NOTE ADULT - SUBJECTIVE AND OBJECTIVE BOX
Patient is a 89y old  Female who presents with a chief complaint of weakness, vomited in ambulance (17 Jul 2017 10:03)        MEDICATIONS  (STANDING):  enoxaparin Injectable 40 milliGRAM(s) SubCutaneous daily  metoprolol succinate ER 12.5 milliGRAM(s) Oral daily  simvastatin 20 milliGRAM(s) Oral at bedtime  sodium chloride 0.9% lock flush 3 milliLiter(s) IV Push every 8 hours  pantoprazole    Tablet 40 milliGRAM(s) Oral before breakfast  levoFLOXacin  Tablet 250 milliGRAM(s) Oral every 24 hours  nystatin Powder 1 Application(s) Topical daily    MEDICATIONS  (PRN):  ALBUTerol/ipratropium for Nebulization 3 milliLiter(s) Nebulizer every 6 hours PRN Shortness of Breath and/or Wheezing      REVIEW OF SYSTEMS:  CONSTITUTIONAL: No fever, change in weight, or fatigue  HEAD: No headache, dizziness or recent trauma  EYES: No eye pain, visual disturbances, or discharge  ENT:  No difficulty hearing, tinnitus, vertigo, No sinus or throat pain  NECK: No pain or stiffness  BREASTS: No pain, masses, or nipple discharge  RESPIRATORY: No cough, wheezing, chills or hemoptysis, No shortness of breath at rest or exertional shortness of breath  CARDIOVASCULAR: No chest pain, palpitations, dizziness, CHF, arrhythmia, cardiomegaly or leg swelling  GASTROINTESTINAL: No abdominal or epigastric pain. resolving nausea,and vomiting no hematemesis, No diarrhea or constipation. No melena or hematochezia.  GENITOURINARY: No dysuria, frequency, hematuria, or incontinence  SKIN: No itching, burning, rashes, or lesions   LYMPH NODES: No history of enlarged glands  ENDOCRINE: No heat or cold intolerance, No hair loss. No osteoporosis or thyroid disease  MUSCULOSKELETAL: No joint pain or swelling, No muscle, back, or extremity pain  PSYCHIATRIC: No depression, anxiety, mood swings, or difficulty sleeping  HEME/LYMPH: No easy bruising, anticoagulants, bleeding disorder or bleeding gums  ALLERGY AND IMMUNOLOGIC: No hives or eczema  NEUROLOGICAL: confused at time    VITALS:   T(C): 36.9 (07-20-17 @ 17:43), Max: 37.1 (07-19-17 @ 22:10)  HR: 88 (07-20-17 @ 17:43) (78 - 98)  BP: 102/66 (07-20-17 @ 17:43) (60/40 - 153/84)  RR: 16 (07-20-17 @ 17:43) (16 - 19)  SpO2: 96% (07-20-17 @ 17:43) (92% - 96%)  Wt(kg): --    Physical Exam:  General Appearance: Appears well, NAD  Neck: Supple  Chest: Crackles at both lung bases. Equal expansion bilaterally Decreased breath sounds at the left base.  CV: Pulse regular presently. S1 S2 no S3 or S4. 3/6 HSN at the LLSB to the axilla  Abdomen: Soft, nontense, appropriate incisional tenderness, dressings clean and dry and intact  Extremities: Grossly symmetric      LABS:    CARDIAC MARKERS ( 20 Jul 2017 11:36 )  x     / < 0.06 ng/mL / 37 u/L / 2.84 ng/mL / x          CBC Full  -  ( 20 Jul 2017 11:36 )  WBC Count : 9.24 K/uL  Hemoglobin : 12.9 g/dL  Hematocrit : 40.3 %  Platelet Count - Automated : 349 K/uL  Mean Cell Volume : 91.0 fL  Mean Cell Hemoglobin : 29.1 pg  Mean Cell Hemoglobin Concentration : 32.0 %  Auto Neutrophil # : x  Auto Lymphocyte # : x  Auto Monocyte # : x  Auto Eosinophil # : x  Auto Basophil # : x  Auto Neutrophil % : x  Auto Lymphocyte % : x  Auto Monocyte % : x  Auto Eosinophil % : x  Auto Basophil % : x    07-20    140  |  101  |  18  ----------------------------<  92  4.0   |  23  |  0.89    Ca    8.9      20 Jul 2017 11:36  Phos  3.3     07-20  Mg     1.8     07-20            CAPILLARY BLOOD GLUCOSE          RADIOLOGY & ADDITIONAL TESTS:      Consultant(s):    Care Discussed with Consultants/Other Providers [ ] YES  [ ] NO

## 2017-07-20 NOTE — PROGRESS NOTE ADULT - ASSESSMENT
Patient is a 89y old Female with resolved SBO.  - Hypotension - f/u work-up. Reached out to medicine for management.  - Discharge pending resolution of hypotension.  - Diet, regular  - OOB

## 2017-07-20 NOTE — PROVIDER CONTACT NOTE (OTHER) - ACTION/TREATMENT ORDERED:
MD aware, will come and assess patient status.
MD helms
PA states to place PIV but hold off fluids & recheck BP
await orders

## 2017-07-20 NOTE — PROGRESS NOTE ADULT - ATTENDING COMMENTS
No overt need for diuresis especially in setting of decreased IVF and overall improvement in SBO. Noted Internist's recommendations, cough is likely secondary to aspiration.
DC planning to rehab  Exam time 40 minutes with 50 % for bedside discussion and counseling with patient and aide
40 minutes spent 50% of which was involved in coordination of care
Contacted Dr Padron and discussed above. I can be reached at  in the event of questions or change in patients condition.  Ismael LAWLER
DC planning to rehab  Exam time 40 minutes with 50 % for bedside discussion and counseling with patient and aide
Hypotension with no prerenal azotemia, unlikely to be volume related. To hold hypertensive medications and be observant for evolving sepsis
Contacted Dr Padron and discussed above. I can be reached at  in the event of questions or change in patients condition.  Ismael LAWLER

## 2017-07-20 NOTE — PROGRESS NOTE ADULT - SUBJECTIVE AND OBJECTIVE BOX
INTERVAL HPI/OVERNIGHT EVENTS: None    STATUS POST:      POST OPERATIVE DAY #:     MEDICATIONS  (STANDING):  enoxaparin Injectable 40 milliGRAM(s) SubCutaneous daily  lisinopril 10 milliGRAM(s) Oral daily  metoprolol succinate ER 12.5 milliGRAM(s) Oral daily  simvastatin 20 milliGRAM(s) Oral at bedtime  sodium chloride 0.9% lock flush 3 milliLiter(s) IV Push every 8 hours  pantoprazole    Tablet 40 milliGRAM(s) Oral before breakfast  levoFLOXacin  Tablet 250 milliGRAM(s) Oral every 24 hours  nystatin Powder 1 Application(s) Topical daily    MEDICATIONS  (PRN):  ALBUTerol/ipratropium for Nebulization 3 milliLiter(s) Nebulizer every 6 hours PRN Shortness of Breath and/or Wheezing      Vital Signs Last 24 Hrs  T(C): 36.8 (20 Jul 2017 05:57), Max: 37.1 (19 Jul 2017 22:10)  T(F): 98.3 (20 Jul 2017 05:57), Max: 98.7 (19 Jul 2017 22:10)  HR: 78 (20 Jul 2017 05:57) (78 - 98)  BP: 139/79 (20 Jul 2017 05:57) (107/63 - 153/84)  BP(mean): --  RR: 18 (20 Jul 2017 05:57) (18 - 18)  SpO2: 95% (20 Jul 2017 05:57) (92% - 95%)  I&O's Detail      REVIEW OF SYSTEMS:  CONSTITUTIONAL: No weakness, fevers or chills  EYES/ENT: No visual changes;  No vertigo or throat pain   NECK: No pain or stiffness  RESPIRATORY: No cough, wheezing, hemoptysis; No shortness of breath  CARDIOVASCULAR: No chest pain or palpitations  GASTROINTESTINAL: No abdominal or epigastric pain. No nausea, vomiting, or hematemesis; No diarrhea or constipation. No melena or hematochezia.  GENITOURINARY: No dysuria, frequency or hematuria  NEUROLOGICAL: No numbness or weakness  SKIN: No itching, burning, rashes, or lesions   All other review of systems is negative unless indicated above.    Physical Exam:  General: WN/WD NAD  Neurology: A&Ox3, nonfocal, CROSS x 4  Respiratory: CTA B/L  CV: RRR, S1S2, no murmurs, rubs or gallops  Abdominal: Soft, NT, ND +BS  Extremities: No edema, + peripheral pulses        LABS:                        12.7   8.39  )-----------( 307      ( 19 Jul 2017 10:35 )             38.6     07-19    141  |  101  |  16  ----------------------------<  112<H>  4.2   |  27  |  1.01    Ca    8.9      19 Jul 2017 10:35            RADIOLOGY & ADDITIONAL STUDIES:

## 2017-07-20 NOTE — PROVIDER CONTACT NOTE (OTHER) - ASSESSMENT
Pt. with CHF should be ordered for daily weights and core measures
faley catheter intact ,
patient asymptomatic. sitting in chair.

## 2017-07-20 NOTE — PROGRESS NOTE ADULT - SUBJECTIVE AND OBJECTIVE BOX
SUBJECTIVE: Pt seen and examined at bedside. No overnight events. Patient comfortable and in no-apparent distress. No nausea, vomiting, or diarrhea. Toelrating regular diet. Out of bed to chair. Episode of hypotension (60/40) are 11:00 am. Patient was in chair at the time, manual BP with pediatric cuff taken. HR was 84 bpm at the time. EKG, Labs, and 500cc bolus ordered.   - Passing gas  - Passing stool   - Pain controlled     Vital Signs Last 24 Hrs  T(C): 36.5 (20 Jul 2017 10:13), Max: 37.1 (19 Jul 2017 22:10)  T(F): 97.7 (20 Jul 2017 10:13), Max: 98.7 (19 Jul 2017 22:10)  HR: 80 (20 Jul 2017 10:35) (78 - 98)  BP: 60/40 (20 Jul 2017 10:35) (60/40 - 153/84)  BP(mean): --  RR: 18 (20 Jul 2017 10:13) (18 - 18)  SpO2: 94% (20 Jul 2017 10:13) (92% - 95%)    Physical Exam:  General Appearance: Appears well, NAD  Neck: Supple  Chest: Equal expansion bilaterally, equal breath sounds  CV: Pulse regular presently  Abdomen: Soft, nontense. Patient tolerating regular diet.   Extremities: Grossly symmetric    LABS:                        12.9   9.24  )-----------( 349      ( 20 Jul 2017 11:36 )             40.3     07-20    142  |  104  |  17  ----------------------------<  95  4.1   |  26  |  0.89    Ca    9.0      20 Jul 2017 07:45  Phos  3.4     07-20  Mg     1.8     07-20            INs and OUTs:

## 2017-07-21 VITALS
RESPIRATION RATE: 18 BRPM | HEART RATE: 80 BPM | DIASTOLIC BLOOD PRESSURE: 63 MMHG | TEMPERATURE: 98 F | SYSTOLIC BLOOD PRESSURE: 170 MMHG | OXYGEN SATURATION: 97 %

## 2017-07-21 RX ADMIN — SODIUM CHLORIDE 3 MILLILITER(S): 9 INJECTION INTRAMUSCULAR; INTRAVENOUS; SUBCUTANEOUS at 05:12

## 2017-07-21 RX ADMIN — PANTOPRAZOLE SODIUM 40 MILLIGRAM(S): 20 TABLET, DELAYED RELEASE ORAL at 06:44

## 2017-07-21 RX ADMIN — NYSTATIN CREAM 1 APPLICATION(S): 100000 CREAM TOPICAL at 12:12

## 2017-07-21 RX ADMIN — SODIUM CHLORIDE 3 MILLILITER(S): 9 INJECTION INTRAMUSCULAR; INTRAVENOUS; SUBCUTANEOUS at 12:12

## 2017-07-21 NOTE — PROGRESS NOTE ADULT - SUBJECTIVE AND OBJECTIVE BOX
Patient is a 89y old  Female who presents with a chief complaint of weakness, vomited in ambulance (17 Jul 2017 10:03)      HPI: Patient is feeling better , S/P resolution of SBO   No fever shild . Pain 0/10 passing gas.    MEDICATIONS  (STANDING):  enoxaparin Injectable 40 milliGRAM(s) SubCutaneous daily  metoprolol succinate ER 12.5 milliGRAM(s) Oral daily  simvastatin 20 milliGRAM(s) Oral at bedtime  sodium chloride 0.9% lock flush 3 milliLiter(s) IV Push every 8 hours  pantoprazole    Tablet 40 milliGRAM(s) Oral before breakfast  levoFLOXacin  Tablet 250 milliGRAM(s) Oral every 24 hours  nystatin Powder 1 Application(s) Topical daily    MEDICATIONS  (PRN):  ALBUTerol/ipratropium for Nebulization 3 milliLiter(s) Nebulizer every 6 hours PRN Shortness of Breath and/or Wheezing      Allergies    penicillamine (Hives)  penicillin (Hives)  penicillins (Hives)    Intolerances        REVIEW OF SYSTEM:  CONSTITUTIONAL: No fever, No change in weight, No fatigue  HEAD: No headache, No dizziness, No recent trauma  EYES: No eye pain, No visual disturbances, No discharge  ENT:  No difficulty hearing, No tinnitus, No vertigo, No sinus pain, No throat pain  NECK: No pain, No stiffness  BREASTS: No pain, No masses, No nipple discharge  RESPIRATORY: No cough, No wheezing, No chills, No hemoptysis, No shortness of breath at rest or exertional shortness of breath  CARDIOVASCULAR: No chest pain, No palpitations, No dizziness, No CHF, No arrhythmia, No cardiomegaly, No leg swelling  GASTROINTESTINAL: No abdominal, No epigastric pain. No nausea, No vomiting, No hematemesis, No diarrhea, No constipation. No melena, No hematochezia. No GERD  (+) mildly distended  GENITOURINARY: No dysuria, No frequency, No hematuria, No incontinence, No nocturia, No hesitancy,  SKIN: No itching, No burning, No rashes, No lesions   LYMPH NODES: No history of enlarged glands  ENDOCRINE: No heat or cold intolerance, No hair loss. No osteoporosis, No thyroid disease  MUSCULOSKELETAL: No joint pain or swelling, No muscle, back, or extremity pain  PSYCHIATRIC: No depression, No anxiety, No mood swings, No difficulty sleeping  HEME/LYMPH: No easy bruising, No anticoagulants, No bleeding disorder, No bleeding gums  ALLERGY AND IMMUNOLOGIC: No hives, No eczema  NEUROLOGICAL: No memory loss, No loss of strength, No numbness, No tremors        VITALS:   T(C): 36.4 (07-21-17 @ 09:51), Max: 37 (07-20-17 @ 22:05)  HR: 87 (07-21-17 @ 09:51) (84 - 90)  BP: 124/85 (07-21-17 @ 09:51) (124/85 - 156/88)  RR: 17 (07-21-17 @ 09:51) (16 - 18)  SpO2: 93% (07-21-17 @ 09:51) (93% - 99%)  Wt(kg): --    07-20 @ 07:01  -  07-21 @ 07:00  --------------------------------------------------------  IN: 650 mL / OUT: 0 mL / NET: 650 mL        PHYSICAL EXAM:  GENERAL: NAD, well nourished and conversant  HEAD:  Atraumatic  EYES: EOM, PERRLA, conjunctiva pink and sclera white  ENT: No tonsillar erythema, exudates, or enlargement, moist mucous membranes, good dentition, no lesions  NECK: Supple, No JVD, normal thyroid, carotids with normal upstrokes and no bruits  CHEST/LUNG: Clear to auscultation bilaterally, No rales, rhonchi, wheezing, or rubs  HEART: Regular rate and rhythm, No murmurs, rubs, or gallops  ABDOMEN: Soft, nondistended, no masses, guarding, tenderness or rebound, bowel sounds present  EXTREMITIES:  2+ Peripheral Pulses, No clubbing, cyanosis, or edema. No arthritis of shoulders, elbows, hands, hips, knees, ankles, or feet. No DJD C spine, T spine, or L/S spine  LYMPH: No lymphadenopathy noted  SKIN: No rashes or lesions  NERVOUS SYSTEM:  Alert & Oriented X3, normal cognitive function. Motor Strength 5/5 right upper and right lower.  5/5 left upper and left lower extremities, DTRs 2+ intact and symmetric    LABS:                          12.9   9.24  )-----------( 349      ( 20 Jul 2017 11:36 )             40.3     07-20    140  |  101  |  18  ----------------------------<  92  4.0   |  23  |  0.89  07-20    142  |  104  |  17  ----------------------------<  95  4.1   |  26  |  0.89  07-19    141  |  101  |  16  ----------------------------<  112<H>  4.2   |  27  |  1.01    Ca    8.9      20 Jul 2017 11:36  Ca    9.0      20 Jul 2017 07:45  Ca    8.9      19 Jul 2017 10:35  Phos  3.3     07-20  Phos  3.4     07-20  Mg     1.8     07-20  Mg     1.8     07-20      CAPILLARY BLOOD GLUCOSE          RADIOLOGY & ADDITIONAL TESTS:      Consultant(s):    Care Discussed with Consultants/Other Providers [ ] YES  [ ] NO

## 2017-07-21 NOTE — PROGRESS NOTE ADULT - SUBJECTIVE AND OBJECTIVE BOX
INTERVAL HPI/OVERNIGHT EVENTS: Pt seen and examined. BP stable. Tolerating diet. Good bowel function      MEDICATIONS  (STANDING):  enoxaparin Injectable 40 milliGRAM(s) SubCutaneous daily  metoprolol succinate ER 12.5 milliGRAM(s) Oral daily  simvastatin 20 milliGRAM(s) Oral at bedtime  sodium chloride 0.9% lock flush 3 milliLiter(s) IV Push every 8 hours  pantoprazole    Tablet 40 milliGRAM(s) Oral before breakfast  levoFLOXacin  Tablet 250 milliGRAM(s) Oral every 24 hours  nystatin Powder 1 Application(s) Topical daily    MEDICATIONS  (PRN):  ALBUTerol/ipratropium for Nebulization 3 milliLiter(s) Nebulizer every 6 hours PRN Shortness of Breath and/or Wheezing      Vital Signs Last 24 Hrs  T(C): 36.7 (21 Jul 2017 05:09), Max: 37 (20 Jul 2017 22:05)  T(F): 98.1 (21 Jul 2017 05:09), Max: 98.6 (20 Jul 2017 22:05)  HR: 86 (21 Jul 2017 05:09) (80 - 90)  BP: 149/96 (21 Jul 2017 05:09) (60/40 - 156/88)  BP(mean): --  RR: 18 (21 Jul 2017 05:09) (16 - 19)  SpO2: 95% (21 Jul 2017 05:09) (94% - 99%)    PHYSICAL EXAM:      Constitutional: AOx3, NAD    Gastrointestinal: Abd soft, NT, ND      I&O's Detail    20 Jul 2017 07:01  -  21 Jul 2017 07:00  --------------------------------------------------------  IN:    IV PiggyBack: 100 mL    Oral Fluid: 50 mL    Sodium Chloride 0.9% IV Bolus: 500 mL  Total IN: 650 mL    OUT:  Total OUT: 0 mL    Total NET: 650 mL          LABS:                        12.9   9.24  )-----------( 349      ( 20 Jul 2017 11:36 )             40.3     07-20    140  |  101  |  18  ----------------------------<  92  4.0   |  23  |  0.89    Ca    8.9      20 Jul 2017 11:36  Phos  3.3     07-20  Mg     1.8     07-20            RADIOLOGY & ADDITIONAL STUDIES:

## 2017-07-21 NOTE — PROGRESS NOTE ADULT - PROBLEM SELECTOR PLAN 1
- Cont diet  - OOB & Abd  - BP control per medicine  - DC to  rehab vs home - Pt seen and examined with Dr Osuna  - Cont diet  - OOB & Abd  - BP control per medicine  - DC to  rehab vs home

## 2017-07-21 NOTE — PROGRESS NOTE ADULT - PROBLEM SELECTOR PROBLEM 2
Aspiration pneumonia of left lower lobe due to vomit
CHF (congestive heart failure)
Multifocal pneumonia

## 2017-07-21 NOTE — PROGRESS NOTE ADULT - PROBLEM SELECTOR PLAN 3
Observe with serial X-rays. thoracentesis only if clinically essential
mental status returning to baseline  Pts aide by the bedside
Observe with serial X-rays. thoracentesis only if clinically essential

## 2017-07-21 NOTE — PROGRESS NOTE ADULT - PROBLEM SELECTOR PROBLEM 4
Hypotension
Non-rheumatic mitral regurgitation

## 2017-07-21 NOTE — PROGRESS NOTE ADULT - PROBLEM SELECTOR PLAN 4
Partial noninvasive clip repair. Patient sees Dr Broderick León, cardiology at [facility]. No CHF over the past 12 months with medical management. Internal medicine input will continue from my group while patient is in [facility].
Partial noninvasive clip repair. Patient sees Dr Broderick León, cardiology at [facility]. No CHF over the past 12 months with medical management. Internal medicine input will continue from my group while patient is in [facility].
Partial noninvasive clip repair. Patient sees Dr Broderick León, cardiology at [facility]. No CHF over the past 12 months with medical management. Internal medicine input will continue from my group while patient is in [facility].
Partial noninvasive clip repair. Patient sees Dr Broderick León, cardiology at [facility]. No CHF over the past 12 months with medical management. Internal medicine input will continue from my group while patient is in [facility].
Patient received a 1 liter bolus  encourage PO
hold lisinopril and encourage PO  will readdress HTN in office as an out pt
hold lisinopril and encourage PO  will readdress HTN in office as an out pt
Partial noninvasive clip repair. Patient sees Dr Broderick León, cardiology at [facility]. No CHF over the past 12 months with medical management. Internal medicine input will continue from my group while patient is in [facility].

## 2017-07-21 NOTE — PROGRESS NOTE ADULT - PROBLEM SELECTOR PROBLEM 1
SBO (small bowel obstruction)
Small bowel obstruction

## 2017-07-21 NOTE — PROGRESS NOTE ADULT - PROBLEM SELECTOR PROBLEM 3
Metabolic encephalopathy
Pleural effusion

## 2018-06-05 NOTE — PATIENT PROFILE ADULT. - ARE SIGNIFICANT INDICATORS COMPLETE.
Dorene 10, 2018      Heri Flores MD  [address]  [address]  [place] [address]           [facility] - Otorhinolaryngology  [address]  [facility] [address]  Phone: [phone]  Fax: [phone]          Patient: Aaron Linda   MR Number: [id number]   YOB: 2016   Date of Visit: 6/5/2018       Dear Dr. Heri Flores:    Thank you for referring Aaron Linda to me for evaluation. Attached you will find relevant portions of my assessment and plan of care.    If you have questions, please do not hesitate to call me. I look forward to following Aaron Linda along with you.    Sincerely,    Emilie Dickinson MD    Enclosure  CC:  Leatha Gary, DO    If you would like to receive this communication electronically, please contact [email or web address] or [phone] to request more information on [employer] access.    For providers and/or their staff who would like to refer a patient to [facility], please contact us through our one-stop-shop provider referral line, [facility], at [phone].    If you feel you have received this communication in error or would no longer like to receive these types of communications, please e-mail [email or web address]         
Yes

## 2021-01-15 NOTE — PATIENT PROFILE ADULT. - NS PRO MODE OF ARRIVAL
Low carb diet  Exercise as tolerated  Get adequate sleep  Hydrate 36-48 oz water daily if tolerated     stretcher

## 2024-05-03 NOTE — DISCHARGE NOTE ADULT - HAS THE PATIENT RECEIVED THE INFLUENZA VACCINE DURING THIS VISIT
CHIEF COMPLAINT  Chief Complaint   Patient presents with    Allergic Rhinitis     Sinusitis       HISTORY OF PRESENT ILLNESS    Fernanda Diego is a 65 y.o. female, here for follow-up of chronic pansinusitis, nasal septal deviation, and seasonal allergies.  \"I'm hanging in there.  Nasal rinse comes out about 2 hours later after rinses.  Doing twice a day.  Not doing the rinses now.  I think I have a wax build up in my ear.  I have to ask for repeats.  I don't use Q-tips.  I clean my ear with soap and water with a wash cloth.  No sinus infections since the last visit.  Occasional sinus headache.  A lot of mucus.\"      REVIEW OF SYSTEMS  Constitutional:  Denied fever and chills.  ENT/sinus:  Denied otalgia, otorrhea, nasal pain, rhinorrhea, sore throat, and sinus/facial pain.        EXAMINATION    WDWN, NAD  Face:  Normal skin with no lesions detected.    Voice:  Normal, with no hoarseness, breathiness, or hot potato quality.  Ears:  the TMs were nonerythematous, dull, with no SATHYA.  The EACs were normal.  The mastoids and pinnae were normal.    Nose:  Normal with no lesions.  Sinuses: Nontender x 4   Throat,  OC/OP:  Normal with no lesions.  Neck:  NT, No masses.  Trachea midline.  Nodes:  No lymphadenopathy.     I performed this head and neck physical exam personally.        IMPRESSION / DIAGNOSES / ORDERS:   Fernanda was seen today for allergic rhinitis  and sinusitis.    Diagnoses and all orders for this visit:    Seasonal allergies    Chronic pansinusitis    Status post functional endoscopic sinus surgery (FESS)         RECOMMENDATIONS / PLAN:   Continue OTC allergy medication, loratadine/Claritin.  Continue fluticasone/Flonase nasal steroid spray.  Prescription drug management:  Since it has been effective treatment, I am continuing Singulair.      Continue nasal/sinus rinse twice daily.  Return in about 6 months (around 11/3/2024) for recheck/follow-up, and sooner if condition worsens.         Sami Smith MD  
No
